# Patient Record
Sex: MALE | Race: BLACK OR AFRICAN AMERICAN | NOT HISPANIC OR LATINO | Employment: FULL TIME | URBAN - METROPOLITAN AREA
[De-identification: names, ages, dates, MRNs, and addresses within clinical notes are randomized per-mention and may not be internally consistent; named-entity substitution may affect disease eponyms.]

---

## 2018-12-14 ENCOUNTER — TELEPHONE (OUTPATIENT)
Dept: FAMILY MEDICINE | Facility: CLINIC | Age: 33
End: 2018-12-14

## 2018-12-14 NOTE — TELEPHONE ENCOUNTER
----- Message from Sondra Tafoya sent at 12/14/2018 12:24 PM CST -----  Contact: Patient  Type: Needs Medical Advice    Who Called:  Patient  Symptoms (please be specific):  na  How long has patient had these symptoms:  braeden  Pharmacy name and phone #:  braeden  Best Call Back Number: [phone] (home)    Additional Information: Patient requesting a call to update if his paperwork was received by the office, please call to advise. Thank you!

## 2018-12-14 NOTE — TELEPHONE ENCOUNTER
Left message, that we did not get any paperwork from the [facility]. Will need to reschedule appointment.

## 2018-12-17 ENCOUNTER — TELEPHONE (OUTPATIENT)
Dept: ENDOCRINOLOGY | Facility: CLINIC | Age: 33
End: 2018-12-17

## 2018-12-17 NOTE — TELEPHONE ENCOUNTER
----- Message from aLmont Schultz sent at 12/17/2018  3:51 PM CST -----  Contact: Patient  Type: Needs Medical Advice    Who Called: Patient  Best Call Back Number: [phone]  Additional Information: Patient would like the office to know that he will return with a referral. Thanks!

## 2018-12-17 NOTE — TELEPHONE ENCOUNTER
----- Message from Hallie Campbell sent at 12/17/2018  4:44 PM CST -----  Type:  Patient Returning Call    Who Called:  Patient  Who Left Message for Patient:  SAMM FARNSWORTH  Does the patient know what this is regarding?:  no  Best Call Back Number:  [phone]

## 2023-07-26 ENCOUNTER — OCCUPATIONAL HEALTH (OUTPATIENT)
Dept: URGENT CARE | Facility: CLINIC | Age: 38
End: 2023-07-26

## 2023-07-26 DIAGNOSIS — Z13.9 ENCOUNTER FOR SCREENING: Primary | ICD-10-CM

## 2023-07-26 PROCEDURE — 80305 DRUG TEST PRSMV DIR OPT OBS: CPT | Mod: S$GLB,,, | Performed by: EMERGENCY MEDICINE

## 2023-07-26 PROCEDURE — 80305 PR COLLECTION ONLY DRUG SCREEN: ICD-10-PCS | Mod: S$GLB,,, | Performed by: EMERGENCY MEDICINE

## 2023-10-16 ENCOUNTER — OCCUPATIONAL HEALTH (OUTPATIENT)
Dept: URGENT CARE | Facility: CLINIC | Age: 38
End: 2023-10-16

## 2023-10-16 DIAGNOSIS — Z13.9 ENCOUNTER FOR SCREENING: Primary | ICD-10-CM

## 2023-10-16 LAB — COLLECTION ONLY: NORMAL

## 2023-10-16 PROCEDURE — 80305 DRUG TEST PRSMV DIR OPT OBS: CPT | Mod: S$GLB,,,

## 2023-10-16 PROCEDURE — 80305 OOH COLLECTION ONLY DRUG SCREEN: ICD-10-PCS | Mod: S$GLB,,,

## 2024-03-12 ENCOUNTER — OFFICE VISIT (OUTPATIENT)
Dept: URGENT CARE | Facility: CLINIC | Age: 39
End: 2024-03-12
Payer: COMMERCIAL

## 2024-03-12 VITALS
HEIGHT: 70 IN | RESPIRATION RATE: 16 BRPM | WEIGHT: 201.19 LBS | OXYGEN SATURATION: 97 % | BODY MASS INDEX: 28.8 KG/M2 | HEART RATE: 90 BPM | DIASTOLIC BLOOD PRESSURE: 72 MMHG | SYSTOLIC BLOOD PRESSURE: 119 MMHG

## 2024-03-12 DIAGNOSIS — M79.661 PAIN IN RIGHT LOWER LEG: ICD-10-CM

## 2024-03-12 DIAGNOSIS — Z02.6 ENCOUNTER RELATED TO WORKER'S COMPENSATION CLAIM: ICD-10-CM

## 2024-03-12 DIAGNOSIS — S70.11XA CONTUSION OF RIGHT THIGH, INITIAL ENCOUNTER: ICD-10-CM

## 2024-03-12 DIAGNOSIS — M79.651 RIGHT THIGH PAIN: ICD-10-CM

## 2024-03-12 DIAGNOSIS — S39.012A LUMBOSACRAL STRAIN, INITIAL ENCOUNTER: Primary | ICD-10-CM

## 2024-03-12 PROCEDURE — 73502 X-RAY EXAM HIP UNI 2-3 VIEWS: CPT | Mod: RT,S$GLB,, | Performed by: RADIOLOGY

## 2024-03-12 PROCEDURE — 99203 OFFICE O/P NEW LOW 30 MIN: CPT | Mod: S$GLB,,, | Performed by: FAMILY MEDICINE

## 2024-03-12 PROCEDURE — 72100 X-RAY EXAM L-S SPINE 2/3 VWS: CPT | Mod: S$GLB,,, | Performed by: RADIOLOGY

## 2024-03-12 RX ORDER — NAPROXEN 500 MG/1
500 TABLET ORAL 2 TIMES DAILY
Qty: 30 TABLET | Refills: 1 | Status: SHIPPED | OUTPATIENT
Start: 2024-03-12 | End: 2024-05-15

## 2024-03-12 RX ORDER — LIDOCAINE 50 MG/G
1 PATCH TOPICAL DAILY
Qty: 15 PATCH | Refills: 1 | Status: SHIPPED | OUTPATIENT
Start: 2024-03-12 | End: 2024-05-15 | Stop reason: SDUPTHER

## 2024-03-12 RX ORDER — METHOCARBAMOL 500 MG/1
500 TABLET, FILM COATED ORAL 2 TIMES DAILY PRN
Qty: 30 TABLET | Refills: 1 | Status: SHIPPED | OUTPATIENT
Start: 2024-03-12 | End: 2024-05-15

## 2024-03-12 NOTE — LETTER
[facility] [place] [address], [address]  [facility] [address]  Phone: [phone]  Fax: [phone]  [facility]: [id number]    Pt Name: Aashish James  Injury Date: 01/10/2024   Employee ID: [id number] Date of First Treatment: 03/12/2024   Company: Networked reference to record EEP 1000[employer]      Appointment Time: 10:15 AM Arrived: 1030   Provider: Gerhard Moy MD Time Out:1230     Office Treatment:   1. Lumbosacral strain, initial encounter    2. Encounter related to worker's compensation claim    3. Right thigh pain    4. Pain in right lower leg    5. Contusion of right thigh, initial encounter      Medications Ordered This Encounter   Medications    LIDOcaine (LIDODERM) 5 %    methocarbamoL (ROBAXIN) 500 MG Tab    naproxen (NAPROSYN) 500 MG tablet      Patient Instructions: Daily home exercises/warm soaks, Attention not to aggravate affected area, Begin Physical Therapy, PT to be scheduled once authorized    Restrictions: No driving company vehicles     Return Appointment: 3/28 or sooner if needed   If Rx a medication that can be sedating, DO NOT TAKE DURING YOUR WORK DAY.    Some OTC measures to help in recovery(if no allergies to, renal issues or pregnant):  Tylenol 325mg 3x per day  Ibuprofen 400mg 3x per day OR Aleve regular strength one tablet 2x per day  Take Pepcid 20mg BID  If applicable or discussed: Magnesium OTC daily; Topical Voltaren Gel; Lidocaine patches, neoprene OTC compression sleeve  Massage area if possible  Resting of the injured area  Ice for ankle, wrist or elbow injury  Elevation of the injured area if applicable  Heating pad for muscle injury  Stretching/ROM exercises as described in clinic.   ** BE ADVISED: You should be in regular contact with your W/C  to know the status of your claim and /or (if any)pending referrals**

## 2024-03-12 NOTE — PROGRESS NOTES
Subjective:      Patient ID: Aashish James is a 38 y.o. male.    Chief Complaint: Fall    WC Initial Visit.  Pt works for [employer].  Pt states that during rainstorm he slipped and fell out of his truck on 1/10/2024.  He hit floor box of cab of truck, step and hit ground. On 1/11 saw homeopathic dr since injury occurred--meds arnica and other naturel herbs. Pt was seen at Urgent Care in [place] 3/7/2024.  Pt had leg and low back xrays--told to use OTC meds (ibuprofen/Tylenol)  .    Pt seen at [facility] in [place]. Performed xrays. Was told to f/u with ortho. .   Pain 8/10 today, pain changes with weather and daily activities  .      Fall  The accident occurred More than 1 week ago. Fall occurred: from truck. He landed on [place]. There was no blood loss. Point of impact: back. The pain is present in the right upper leg and back. The pain is at a severity of 8/10. The pain is severe. The symptoms are aggravated by cold and rotation. He has tried acetaminophen (ibuprofen) for the symptoms. The treatment provided moderate relief.     ROS  Objective:     Physical Exam  Musculoskeletal:      Lumbar back: Tenderness present. Negative right straight leg raise test and negative left straight leg raise test.        Back:         Legs:       Comments: FF to fingers 2 inches from ground.   Neg SLR  Neg DREW  Neg slump.     Inc pain in thigh and lower leg with provocative maneuvers.       Subjective c/o pain out of proportion to PE findings.   Assessment:      1. Lumbosacral strain, initial encounter    2. Encounter related to worker's compensation claim    3. Right thigh pain    4. Pain in right lower leg    5. Contusion of right thigh, initial encounter      Plan:   Pt declined toradol injection.     Medications Ordered This Encounter   Medications    LIDOcaine (LIDODERM) 5 %     Sig: Place 1 patch onto the skin once daily. Remove & Discard patch within 12 hours or as directed by MD     Dispense:  15 patch     Refill:   1    methocarbamoL (ROBAXIN) 500 MG Tab     Sig: Take 1 tablet (500 mg total) by mouth 2 (two) times daily as needed.     Dispense:  30 tablet     Refill:  1    naproxen (NAPROSYN) 500 MG tablet     Sig: Take 1 tablet (500 mg total) by mouth 2 (two) times daily.     Dispense:  30 tablet     Refill:  1     Patient Instructions: Daily home exercises/warm soaks, Attention not to aggravate affected area, Begin Physical Therapy, PT to be scheduled once authorized      No follow-ups on file.      X-Ray Hip 2 or 3 views Right (with Pelvis when performed)    Result Date: 3/12/2024  EXAMINATION: XR HIP WITH PELVIS WHEN PERFORMED, 2 OR 3  VIEWS RIGHT CLINICAL HISTORY: Pain in right thigh TECHNIQUE: AP view of the pelvis and frog leg lateral view of the right hip were performed. COMPARISON: None FINDINGS: A fracture of the right hip is not seen.  The femur and acetabulum are intact.  The bones of the pelvis and the left hip are intact.  The sacroiliac joints are sharp and symmetric.     Negative radiographs of the right hip and pelvis. Electronically signed by: Tyler Singh MD Date:    03/12/2024 Time:    11:55    XR LUMBAR SPINE 2 OR 3 VIEWS    Result Date: 3/12/2024  EXAMINATION: XR LUMBAR SPINE 2 OR 3 VIEWS CLINICAL HISTORY: Strain of muscle, fascia and tendon of lower back, initial encounter COMPARISON: 05/02/2005 FINDINGS: Four views lumbar spine. Lateral imaging demonstrates adequate alignment of the lumbar spine without significant vertebral body height loss or disc space height loss.  There is superior endplate height loss involving T11, may be on the basis of Schmorl's node however correlation with any focal tenderness recommended.  The facet joints are aligned.  The sacral coccygeal segments are aligned.  AP spinal alignment is remarkable for dextroscoliotic curvature.  The sacroiliac joints are intact.     1. No acute displaced fracture or dislocation of the lumbar spine. 2. Height loss involving the superior  endplate of T11, may be on the basis of Schmorl's node however no prior exams are available for comparison.  Correlation with any focal tenderness as warranted. Electronically signed by: Dixon Lowe MD Date:    03/12/2024 Time:    11:51

## 2024-03-20 ENCOUNTER — CLINICAL SUPPORT (OUTPATIENT)
Dept: REHABILITATION | Facility: HOSPITAL | Age: 39
End: 2024-03-20
Payer: COMMERCIAL

## 2024-03-20 DIAGNOSIS — M54.50 ACUTE BILATERAL LOW BACK PAIN WITHOUT SCIATICA: ICD-10-CM

## 2024-03-20 DIAGNOSIS — S39.012S STRAIN OF LUMBAR REGION, SEQUELA: Primary | ICD-10-CM

## 2024-03-20 DIAGNOSIS — Z74.09 IMPAIRED FUNCTIONAL MOBILITY AND ACTIVITY TOLERANCE: ICD-10-CM

## 2024-03-20 DIAGNOSIS — M79.604 RIGHT LEG PAIN: ICD-10-CM

## 2024-03-20 DIAGNOSIS — M79.661 PAIN OF RIGHT LOWER LEG: ICD-10-CM

## 2024-03-20 PROCEDURE — 97530 THERAPEUTIC ACTIVITIES: CPT | Mod: PN

## 2024-03-20 PROCEDURE — 97161 PT EVAL LOW COMPLEX 20 MIN: CPT | Mod: PN

## 2024-03-20 NOTE — PLAN OF CARE
[facility]  Physical Therapy Initial Evaluation    Name: Aashish James  Clinic Number: [id number]    Therapy Diagnosis:   Encounter Diagnoses   Name Primary?    Strain of lumbar region, sequela Yes    Pain of right lower leg     Acute bilateral low back pain without sciatica     Right leg pain     Impaired functional mobility and activity tolerance       Physician: Gerhard Moy, *    Physician Orders: PT Eval and Treat  Medical Diagnosis from Referral:   S39.012A (ICD-10-CM) - Lumbosacral strain, initial encounter   M79.651 (ICD-10-CM) - Right thigh pain   M79.661 (ICD-10-CM) - Pain in right lower leg   S70.11XA (ICD-10-CM) - Contusion of right thigh, initial encounter     Evaluation Date: 3/20/2024  Authorization Period Expiration: 12/31/24  Plan of Care Expiration: 6/1/24    Progress Update: 4/22/24    Visit # / Visits authorized: 1 / 12    FOTO: Visit #1 - 1/3     PRECAUTIONS: Standard Precautions     MD Follow-up: /2023    Time In: 200  Time Out: 300  Total Appointment Time (timed & untimed codes): 60 minutes    SUBJECTIVE     Date of onset: January 10th     History of current condition - Aashish is a 38 y.o. male whom reports slipped out of truck landed on right hip, causing pain in his low back and right leg to his ankle.  Also reporting his left neck and shoulder feel tight. . Feels like he is getting worse, more stiff  since injury.  No treatment so far. Pain in his back down to his right ankle. Bending forward, sleeping on left , squatting ,walking all hurt..  Aashish's current exercise regiment includes: none .  Seeking Physical Therapy for get out of pain and back to work .    RAMY: Slip fall   Falls: 1   Physician Instructions (per patient): none   Other concerns: none     Imaging: X-RAY: Impression:     1. No acute displaced fracture or dislocation of the lumbar spine.  2. Height loss involving the superior endplate of T11, may be on the basis of Schmorl's node  however no prior exams are available for comparison.  Correlation with any focal tenderness as warranted.       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    Surgical History:   Aashish James  has no past surgical history on file.    Medications:   Aashish has a current medication list which includes the following prescription(s): lidocaine, methocarbamol, and naproxen.    Allergies:   Review of patient's allergies indicates:  No Known Allergies     OBJECTIVE     RANGE OF MOTION:      Lumbar AROM/PROM Right  (spine) Left   Pain/Dysfunction with Movement Goal   Lumbar Flexion (60) 30  Pain Reported 60  Initial:    Lumbar Extension (30) 10  Pain Reported  30  Initial:    Lumbar Side Bending (25) 10 10 Pain to the right  25  Initial:    Lumbar Rotation 5 5  Pain Free  Initial:      Hip AROM/PROM Right   Left   Pain/Dysfunction with Movement Goal   Hip IR (45) 25 30  40  Initial:    Hip ER (45) 30 30  40  Initial:      NEURO SCREEN:    Neuro Testing Right   Left   Details   Reflexes  Not tested  Not tested     Dermatomes normal normal    Myotomes normal normal    Tone normal normal    Spasticity Not present Not present        FUNCTIONAL SCREEN:    Lower Extremity STRENGTH Details   Hip Grossly 4-/5 Right with pain during testing   Knee Grossly 4-/5 Pain with  testing    Foot/Ankle Grossly 5/5 No pain or discomfort     Abdominal Strength STRENGTH Details   Pelvic Tilt Poor     Double Leg Drop Unable     Plank Not tested         JOINT MOBILITY:     Joint Motion Tested Right  (spine)   Left    Goal               Thoracic Mobility: T1-12 Hypomobile Hypomobile Normal B   Lumbar Mobility: L1-5 Hypomobile Hypomobile Normal B   Sacral Mobility Hypomobile Hypomobile Normal B     SPECIAL TESTS:     Right  (spine)   Left    Goal   Slump Negative Negative Negative B    Straight leg raise  Negative Negative Negative B    Sacral Provocation Negative Negative Negative B      Sensation:  Sensation is intact to light touch    Palpation: Increased tone and tenderness noted with palpation to right lumbar paraspinals and right QL     Posture:  Pt presents with postural abnormalities which include: Lordosis, sits with lean to left to avoid pressure on the right     Gait Analysis: The patient ambulated with the following assistive device: none; the pt presents with the following gait abnormalities: decreased step length, kathy, and arm swing; increased forward flexed posture, trunk sway -     Movement Analysis:   Functional Test  Outcome   Double Leg Squat Lack of hip motion, lack of depth    Single Leg Step Down Unable        Balance: Balance:    RIGHT   LEFT   Goal   Closed   60 seconds     Tandem   20 seconds     Single Leg 2 8 20 seconds         FUNCTION:     CMS Impairment/Limitation/Restriction for FOTO oswestry  Survey    Therapist reviewed FOTO scores for Aashish James on 3/20/2024.   FOTO documents entered into [email or web address] - see Media section.    Limitation Score: 55%         TREATMENT     Total Treatment time separate from Evaluation: (15) minutes    Aashish received the treatments listed below:      THERAPEUTIC EXERCISES: to develop strength, endurance, ROM, flexibility, posture and core stabilization for (15)  minutes including: x = performed today    TherEx 3/20/2024    Lower trunk  rotation      Supine Hip Abduction Green TheraBand      Prone on elbows  Right Sciatic glides      Stand Hip Extension leaning on mat      BOLD = new this visit  Plan for Next Visit:     Bike or Nu Step     Lower trunk rotation  Posterior pelvic tilt   Hip ADD Ball   Bridges   Sidelying clams     Hamstring stretch  Gastroc stretch   PhysioBall rollout    Shuttle Bilateral   Shuttle single leg  Palloff press  Precor lumbar extension         PATIENT EDUCATION AND HOME EXERCISES     Education/Self-Care provided:  (included in treatment) minutes   Patient educated on the impairments noted above and the effects of physical therapy intervention to improve overall condition and QOL.   Patient was educated on all the above exercise prior/during/after for proper posture, positioning, and execution for safe performance with home exercise program.   Exercise/Activity modifications:   3/20/2024: EVAL:     Written Home Exercises Provided: yes. Prefers: Electronic Copies  Exercises were reviewed and Aashish was able to demonstrate them prior to the end of the session.  Aashish demonstrated good understanding of the education provided. See EMR under Patient Instructions for exercises provided during therapy sessions.    ASSESSMENT     Aashish is a 38 y.o. male referred to outpatient Physical Therapy with a medical diagnosis of Low back and right leg pain after slip and fall from truck .  Aashish presents with clinical signs and symptoms that support this diagnosis with . Decreased lumbar ROM, decreased lower extremity strength (3-/5 Right due to pain),  lower extremity neural tension, and impaired functional mobility. Radicular symptoms are present from the lumbar spine down into the forefoot of his left lower extremity.    The above impairments will be addressed through manual therapy techniques, therapeutic exercises, functional training, and modalities as necessary. Patient was treated and educated on exercises for home  program, progression of therapy, and benefits of therapy to achieve full functional mobility.     Pt prognosis is Fair.   Pt will benefit from skilled outpatient Physical Therapy to address the deficits stated above and in the chart below, provide pt/family education, and to maximize pt's level of independence.     Plan of care discussed with patient: Yes  Pt's spiritual, cultural and educational needs considered and patient is agreeable to the plan of care and goals as stated below:     Anticipated Barriers for therapy: none  Medical Necessity is demonstrated by the following  History  Co-morbidities and personal factors that may impact the plan of care [x] LOW: no personal factors / co-morbidities  [] MODERATE: 1-2 personal factors / co-morbidities  [] HIGH: 3+ personal factors / co-morbidities    Moderate / High Support Documentation:      Examination  Body Structures and Functions, activity limitations and participation restrictions that may impact the plan of care [x] LOW: addressing 1-2 elements  [] MODERATE: 3+ elements  [] HIGH: 4+ elements (please support below)    Moderate / High Support Documentation:      Clinical Presentation [x] LOW: stable  [] MODERATE: Evolving  [] HIGH: Unstable     Decision Making/ Complexity Score: low         GOALS:  SHORT TERM GOALS:  3 weeks  Progress Date met   Recent signs and systems trend is improving in order to progress towards LTG's. [] Met  [] Not Met  [x] Progressing     Patient will be independent with HEP in order to further progress and return to maximal function. [] Met  [] Not Met  [x] Progressing     Pain rating at Worst: 5/10 in order to progress towards increased independence with activity. [] Met  [] Not Met  [x] Progressing     Patient will be able to correct postural deviations in sitting and standing, to decrease pain and promote postural awareness for injury prevention.  [] Met  [] Not Met  [x] Progressing      [] Met  [] Not Met  [] Progressing         LONG TERM GOALS: 6 weeks  Progress Date met   Patient will return to normal ADL, recreational, and work related activities with less pain and limitation.  [] Met  [] Not Met  [x] Progressing     Patient will improve AROM to stated goals in order to return to maximal functional potential.  [] Met  [] Not Met  [x] Progressing     Patient will improve Strength to stated goals of appropriate musculature in order to improve functional independence.  [] Met  [] Not Met  [x] Progressing     Pain Rating at Best: 1/10 to improve Quality of Life.  [] Met  [] Not Met  [x] Progressing     Patient will meet predicted functional outcome (FOTO) score: 39% to increase self-worth & perceived functional ability. [] Met  [] Not Met  [x] Progressing     Patient will have met/partially met personal goal of: Lower back and leg pain and able to return to work  [] Met  [] Not Met  [x] Progressing      [] Met  [] Not Met  [] Progressing        PLAN   Plan of care Certification: 3/20/2024 to 6/1/24    Outpatient Physical Therapy 2 times weekly for 6 weeks to include any combination of the following interventions: virtual visits, dry needling, modalities, electrical stimulation (IFC, Pre-Mod, Attended with Functional Dry Needling), Manual Therapy, Moist Heat/ Ice, Neuromuscular Re-ed, Patient Education, Self Care, Therapeutic Activities, Therapeutic Exercise, Functional Training, and Therapeutic Activites     Thank you for this referral.    Tyler Painting, PT      I CERTIFY THE NEED FOR THESE SERVICES FURNISHED UNDER THIS PLAN OF TREATMENT AND WHILE UNDER MY CARE   Physician's comments:     Physician's Signature: ___________________________________________________

## 2024-03-21 PROBLEM — M79.604 RIGHT LEG PAIN: Status: ACTIVE | Noted: 2024-03-21

## 2024-03-21 PROBLEM — Z74.09 IMPAIRED FUNCTIONAL MOBILITY AND ACTIVITY TOLERANCE: Status: ACTIVE | Noted: 2024-03-21

## 2024-03-21 PROBLEM — M54.50 ACUTE BILATERAL LOW BACK PAIN WITHOUT SCIATICA: Status: ACTIVE | Noted: 2024-03-21

## 2024-03-28 ENCOUNTER — OFFICE VISIT (OUTPATIENT)
Dept: URGENT CARE | Facility: CLINIC | Age: 39
End: 2024-03-28
Payer: OTHER MISCELLANEOUS

## 2024-03-28 VITALS
TEMPERATURE: 98 F | RESPIRATION RATE: 18 BRPM | DIASTOLIC BLOOD PRESSURE: 74 MMHG | BODY MASS INDEX: 28.77 KG/M2 | OXYGEN SATURATION: 97 % | HEART RATE: 70 BPM | HEIGHT: 70 IN | SYSTOLIC BLOOD PRESSURE: 124 MMHG | WEIGHT: 201 LBS

## 2024-03-28 DIAGNOSIS — M25.512 ACUTE PAIN OF BOTH SHOULDERS: ICD-10-CM

## 2024-03-28 DIAGNOSIS — M25.511 ACUTE PAIN OF BOTH SHOULDERS: ICD-10-CM

## 2024-03-28 DIAGNOSIS — Z02.6 ENCOUNTER RELATED TO WORKER'S COMPENSATION CLAIM: Primary | ICD-10-CM

## 2024-03-28 DIAGNOSIS — M79.651 RIGHT THIGH PAIN: ICD-10-CM

## 2024-03-28 DIAGNOSIS — S39.012D LUMBOSACRAL STRAIN, SUBSEQUENT ENCOUNTER: ICD-10-CM

## 2024-03-28 DIAGNOSIS — S70.11XD CONTUSION OF RIGHT THIGH, SUBSEQUENT ENCOUNTER: ICD-10-CM

## 2024-03-28 DIAGNOSIS — M79.661 PAIN IN RIGHT LOWER LEG: ICD-10-CM

## 2024-03-28 PROCEDURE — 99213 OFFICE O/P EST LOW 20 MIN: CPT | Mod: S$GLB,,, | Performed by: FAMILY MEDICINE

## 2024-03-28 NOTE — PROGRESS NOTES
Subjective:      Patient ID: Aashish James is a 38 y.o. male.    Chief Complaint:   Patient's place of employment - [employer]  Patient's job title - /owner.   Date of Injury - 1/10/2024  Body part injured - leg and back  Current work status per last visit -No driving company vehicles   Improved, same, or worse - worse  Pain Scale right now (1-10) - 6  Pt states that he's having more pain in the left shoulder area, he's also taking Naproxen            3/12/2024- Initial Visit.  Pt works for [email or web address].  Pt states that during rainstorm he slipped and fell out of his truck on 1/10/2024.  He hit floor box of cab of truck, step and hit ground. On 1/11 saw homeopathic dr since injury occurred--meds arnica and other naturel herbs. Pt was seen at Urgent Care in [place] 3/7/2024.  Pt had leg and low back xrays--told to use OTC meds (ibuprofen/Tylenol)  .    Pt seen at [facility] in [place]. Performed xrays. Was told to f/u with ortho. .   Pain 8/10 today, pain changes with weather and daily activities  .      Back Pain  This is a new problem. The current episode started in the past 7 days. The problem occurs intermittently. The problem has been gradually worsening since onset. Pain location: cervical region. The quality of the pain is described as aching (pinched nerve/ stinging). The pain radiates to the right thigh (left shoulder). The pain is at a severity of 6/10. The pain is moderate. The pain is Worse during the day. The symptoms are aggravated by bending and position. Stiffness is present All day. Associated symptoms include weakness. Treatments tried: robaxin and naproxen. The treatment provided mild relief.       Musculoskeletal:  Positive for back pain.     Objective:     Physical Exam  Musculoskeletal:        Arms:       Normal ROM of shoulders.       Musculoskeletal:      Lumbar back: Tenderness present. Negative right straight leg raise test and negative left straight leg raise test.         Back:         Legs:       Comments: FF to fingers 2 inches from ground.   Neg SLR  Neg DREW  Neg slump.     Inc pain in thigh and lower leg with provocative maneuvers.           Subjective c/o pain out of proportion to PE findings.     Assessment:      1. Encounter related to worker's compensation claim    2. Lumbosacral strain, subsequent encounter    3. Right thigh pain    4. Pain in right lower leg    5. Contusion of right thigh, subsequent encounter    6. Acute pain of both shoulders      Plan:     Pt reports pain of upper back/shoulders today- did not mention at initial visit. Pt states that it may be because of the way he is sleeping to help accommodate for the significant pain that he is having on his right thigh. Of not pt ambulating without difficulty.   1 PT session- reports that he missed his first appt bc he could not get out of bed. Advised that it would be prudent to keep all appts scheduled.      Patient Instructions: Continue Physical Therapy, Attention not to aggravate affected area, Daily home exercises/warm soaks   Restrictions: No driving company vehicles  No follow-ups on file.

## 2024-03-28 NOTE — LETTER
[facility] [place] [address], [address]  [facility] [address]  Phone: [phone]  Fax: [phone]  [facility]: [email or web address]    Pt Name: Aashish James  Injury Date: 01/10/2024   Employee ID: [id number] Date of Treatment: 03/28/2024   Company: Networked reference to record EEP 1000[employer]      Appointment Time: 01:15 PM Arrived: 311   Provider: Gerhard Moy MD Time Out:350     Office Treatment:   1. Encounter related to worker's compensation claim    2. Lumbosacral strain, subsequent encounter    3. Right thigh pain    4. Pain in right lower leg    5. Contusion of right thigh, subsequent encounter    6. Acute pain of both shoulders          Patient Instructions: Continue Physical Therapy, Attention not to aggravate affected area, Daily home exercises/warm soaks    Restrictions: No driving company vehicles     Return Appointment: 5/15 or sooner if needed     If Rx a medication that can be sedating, DO NOT TAKE DURING YOUR WORK DAY.    Some OTC measures to help in recovery(if no allergies to, renal issues or pregnant):  Tylenol 325mg 3x per day  Ibuprofen 400mg 3x per day OR Aleve regular strength one tablet 2x per day  Take Pepcid 20mg BID  If applicable or discussed: Magnesium OTC daily; Topical Voltaren Gel; Lidocaine patches, neoprene OTC compression sleeve  Massage area if possible  Resting of the injured area  Ice for ankle, wrist or elbow injury  Elevation of the injured area if applicable  Heating pad for muscle injury  Stretching/ROM exercises as described in clinic.   ** BE ADVISED: You should be in regular contact with your W/C  to know the status of your claim and /or (if any)pending referrals**

## 2024-04-02 ENCOUNTER — CLINICAL SUPPORT (OUTPATIENT)
Dept: REHABILITATION | Facility: HOSPITAL | Age: 39
End: 2024-04-02
Payer: COMMERCIAL

## 2024-04-02 DIAGNOSIS — M79.604 RIGHT LEG PAIN: ICD-10-CM

## 2024-04-02 DIAGNOSIS — Z74.09 IMPAIRED FUNCTIONAL MOBILITY AND ACTIVITY TOLERANCE: ICD-10-CM

## 2024-04-02 DIAGNOSIS — M54.50 ACUTE BILATERAL LOW BACK PAIN WITHOUT SCIATICA: Primary | ICD-10-CM

## 2024-04-02 PROCEDURE — 97112 NEUROMUSCULAR REEDUCATION: CPT | Mod: PN

## 2024-04-02 NOTE — PROGRESS NOTES
[facility]  Outpatient Physical Therapy Daily Treatment Note      Name: Aashish James  Clinic Number: [id number]  Visit Date: 4/2/2024    Therapy Diagnosis:   Encounter Diagnoses   Name Primary?    Acute bilateral low back pain without sciatica Yes    Right leg pain     Impaired functional mobility and activity tolerance        Physician: Gerhard Moy, *  Physician Orders: PT Eval and Treat  Medical Diagnosis from Referral:   S39.012A (ICD-10-CM) - Lumbosacral strain, initial encounter   M79.651 (ICD-10-CM) - Right thigh pain   M79.661 (ICD-10-CM) - Pain in right lower leg   S70.11XA (ICD-10-CM) - Contusion of right thigh, initial encounter      Evaluation Date: 3/20/2024  Authorization Period Expiration: 12/31/24  Plan of Care Expiration: 6/1/24                      Progress Update: 4/22/24                   Visit # / Visits authorized: 1 / 12                    FOTO: Visit #1 - 1/3     PTA Visit #: 0/5     FOTO Due Visit 5 -  Follow up  FOTO Due Visit 10 - Follow up    Time In: 200  Time Out: 240  Total Billable Timed: 40 minutes  Total Billable Un-timed: 0 minutes    Precautions: Standard    Subjective     The patient presents to therapy Continued pain, has been doing Home Exercise Program     Response to previous treatment: Eval   Functional change: none     Pain: 6/10  Location: right back  and Lower Extremity       Objective       Aashish received therapeutic exercises to develop strength, endurance, ROM, flexibility, posture, and core stabilization for  minutes including:  +Bike or Nu Step       Aashish participated in neuromuscular re-education activities to improve: Balance, Coordination, Kinesthetic, Sense, Proprioception, and Posture for 40 minutes. The following activities were included:    Lower trunk rotation x 20  Posterior pelvic tilt x 20   Hip ADD Ball x 20   Bridges x 20   Sidelying clams x 20      Hamstring stretch 3 x 30 sec   Gastroc stretch 3 x 30 sec    PhysioBall rollout x 20   Right Sciatic Glides with foot propped x 20      Shuttle Bilateral 37 x 20   Shuttle single leg 12# x 20   Palloff press 7# x 20   +Precor lumbar extension   Stand hip extension with Wedge x 20 bilateral     Patient Education and HEP     He was compliant with home exercise program.    Education provided:   - Continue with Home Exercise Program, stay as active as possible     Written Home Exercises Provided: Patient instructed to cont prior HEP.  Exercises were reviewed and Aashish was able to demonstrate them prior to the end of the session.  Aashish demonstrated fair  understanding of the education provided.     See EMR under Patient Instructions for exercises provided prior visit.    Assessment     The patient has back and right leg pain from accident at work.  Motion is guarded.  Pain with back extension.  Nerve glides to help with Right Lower Extremity pain    Pt will continue to benefit from skilled outpatient physical therapy to address the deficits listed in the problem list box on initial evaluation, provide pt/family education and to maximize pt's level of independence in the home and community environment.     Aashish Is progressing well towards his goals.   Pt prognosis is Good.     Pt's spiritual, cultural and educational needs considered and pt agreeable to plan of care and goals.    Anticipated barriers to physical therapy: none    Goals:   SHORT TERM GOALS:  3 weeks  Progress Date met   Recent signs and systems trend is improving in order to progress towards LTG's. [] Met  [] Not Met  [x] Progressing     Patient will be independent with HEP in order to further progress and return to maximal function. [] Met  [] Not Met  [x] Progressing     Pain rating at Worst: 5/10 in order to progress towards increased independence with activity. [] Met  [] Not Met  [x] Progressing     Patient will be able to correct postural deviations in sitting and standing, to decrease pain and  promote postural awareness for injury prevention.  [] Met  [] Not Met  [x] Progressing       [] Met  [] Not Met  [] Progressing        LONG TERM GOALS: 6 weeks  Progress Date met   Patient will return to normal ADL, recreational, and work related activities with less pain and limitation.  [] Met  [] Not Met  [x] Progressing     Patient will improve AROM to stated goals in order to return to maximal functional potential.  [] Met  [] Not Met  [x] Progressing     Patient will improve Strength to stated goals of appropriate musculature in order to improve functional independence.  [] Met  [] Not Met  [x] Progressing     Pain Rating at Best: 1/10 to improve Quality of Life.  [] Met  [] Not Met  [x] Progressing     Patient will meet predicted functional outcome (FOTO) score: 39% to increase self-worth & perceived functional ability. [] Met  [] Not Met  [x] Progressing     Patient will have met/partially met personal goal of: Lower back and leg pain and able to return to work  [] Met  [] Not Met  [x] Progressing        Plan     Continue with the plan of care established per initial evaluation    Tyler Painting, PT

## 2024-04-04 ENCOUNTER — DOCUMENTATION ONLY (OUTPATIENT)
Dept: REHABILITATION | Facility: HOSPITAL | Age: 39
End: 2024-04-04

## 2024-04-04 ENCOUNTER — CLINICAL SUPPORT (OUTPATIENT)
Dept: REHABILITATION | Facility: HOSPITAL | Age: 39
End: 2024-04-04
Payer: COMMERCIAL

## 2024-04-04 DIAGNOSIS — Z74.09 IMPAIRED FUNCTIONAL MOBILITY AND ACTIVITY TOLERANCE: ICD-10-CM

## 2024-04-04 DIAGNOSIS — M54.50 ACUTE BILATERAL LOW BACK PAIN WITHOUT SCIATICA: Primary | ICD-10-CM

## 2024-04-04 DIAGNOSIS — M79.604 RIGHT LEG PAIN: ICD-10-CM

## 2024-04-04 PROCEDURE — 97112 NEUROMUSCULAR REEDUCATION: CPT | Mod: PN,CQ

## 2024-04-04 PROCEDURE — 97110 THERAPEUTIC EXERCISES: CPT | Mod: PN,CQ

## 2024-04-04 NOTE — PROGRESS NOTES
[facility]  Outpatient Physical Therapy Daily Treatment Note      Name: Aashish James  Clinic Number: [id number]  Visit Date: 4/4/2024    Therapy Diagnosis:   Encounter Diagnoses   Name Primary?    Acute bilateral low back pain without sciatica Yes    Right leg pain     Impaired functional mobility and activity tolerance        Physician: Gerhard Moy, *  Physician Orders: PT Eval and Treat  Medical Diagnosis from Referral:   S39.012A (ICD-10-CM) - Lumbosacral strain, initial encounter   M79.651 (ICD-10-CM) - Right thigh pain   M79.661 (ICD-10-CM) - Pain in right lower leg   S70.11XA (ICD-10-CM) - Contusion of right thigh, initial encounter      Evaluation Date: 3/20/2024  Authorization Period Expiration: 12/31/24  Plan of Care Expiration: 6/1/24                      Progress Update: 4/22/24                   Visit # / Visits authorized: 2 / 12                    FOTO: Visit #1 - 1/3     FOTO Due Visit 5 -  Follow up  FOTO Due Visit 10 - Follow up    Time In: 400p  Time Out: 450p  Total Billable Timed: 50 minutes  Total Billable Un-timed: 0 minutes    Precautions: Standard    Subjective     Pain in Low Back remains    Response to previous treatment: no complaints   Functional change: none     Pain: 8/10  Location: right back  and Lower Extremity       Objective   Tech assisted with treatment  Aashish received therapeutic exercises to develop strength, endurance, ROM, flexibility, posture, and core stabilization for 10 minutes including:     Bike x 10 minutes, level 3    Aashish participated in neuromuscular re-education activities to improve: Balance, Coordination, Kinesthetic, Sense, Proprioception, and Posture for 40 minutes:    Lower trunk rotation x 20  Posterior pelvic tilt x 20   Hip ADD Ball x 20   Bridges x 20   Sidelying clams Green Theraband x 20     Stand hip extension with Wedge x 20 bilateral     Hamstring stretch 3 x 30 sec   Gastroc stretch 3 x 30 sec   PhysioBall  rollout x 20   Right Sciatic Glides with foot propped x 20      Shuttle Bilateral 37 x 20   Shuttle single leg 12# x 20     Palloff press 7# x 20     Precor lumbar extension 40# x 20      Patient Education and HEP     He was compliant with home exercise program.    Education provided:   - Continue with Home Exercise Program, stay as active as possible     Written Home Exercises Provided: Patient instructed to cont prior HEP.  Exercises were reviewed and Aashish was able to demonstrate them prior to the end of the session.  Aashish demonstrated fair  understanding of the education provided.     See EMR under Patient Instructions for exercises provided prior visit.    Assessment     Aashish tolerated all PRE's with good form.  He ambulates with a smooth kathy, even step length, and upright posture.   No pain with transfers supine-sit-stand.  He reported increased soreness in his Low Back with exercises.  Continued core and hip strengthening along with neural glides to decrease Low back and LE pain.      Pt will continue to benefit from skilled outpatient physical therapy to address the deficits listed in the problem list box on initial evaluation, provide pt/family education and to maximize pt's level of independence in the home and community environment.     Aashish Is progressing well towards his goals.   Pt prognosis is Good.     Pt's spiritual, cultural and educational needs considered and pt agreeable to plan of care and goals.    Anticipated barriers to physical therapy: none    Goals:   SHORT TERM GOALS:  3 weeks  Progress  4/4/2024 Date met   Recent signs and systems trend is improving in order to progress towards LTG's. [] Met  [] Not Met  [x] Progressing     Patient will be independent with HEP in order to further progress and return to maximal function. [] Met  [] Not Met  [x] Progressing     Pain rating at Worst: 5/10 in order to progress towards increased independence with activity. [] Met  [] Not  Met  [x] Progressing     Patient will be able to correct postural deviations in sitting and standing, to decrease pain and promote postural awareness for injury prevention.  [] Met  [] Not Met  [x] Progressing       [] Met  [] Not Met  [] Progressing        LONG TERM GOALS: 6 weeks  Progress  4/4/2024 Date met   Patient will return to normal ADL, recreational, and work related activities with less pain and limitation.  [] Met  [] Not Met  [x] Progressing     Patient will improve AROM to stated goals in order to return to maximal functional potential.  [] Met  [] Not Met  [x] Progressing     Patient will improve Strength to stated goals of appropriate musculature in order to improve functional independence.  [] Met  [] Not Met  [x] Progressing     Pain Rating at Best: 1/10 to improve Quality of Life.  [] Met  [] Not Met  [x] Progressing     Patient will meet predicted functional outcome (FOTO) score: 39% to increase self-worth & perceived functional ability. [] Met  [] Not Met  [x] Progressing     Patient will have met/partially met personal goal of: Lower back and leg pain and able to return to work  [] Met  [] Not Met  [x] Progressing        Plan     Continue with the plan of care established per initial evaluation    Betzy Griffith, PTA

## 2024-04-04 NOTE — PROGRESS NOTES
PT/PTA met face to face to discuss pt's treatment plan and progress towards established goals. Pt will be seen by a physical therapist minimally every 6th visit or every 30 days.    Betzy Griffith PTA

## 2024-04-09 ENCOUNTER — CLINICAL SUPPORT (OUTPATIENT)
Dept: REHABILITATION | Facility: HOSPITAL | Age: 39
End: 2024-04-09
Payer: COMMERCIAL

## 2024-04-09 DIAGNOSIS — M79.604 RIGHT LEG PAIN: ICD-10-CM

## 2024-04-09 DIAGNOSIS — M54.50 ACUTE BILATERAL LOW BACK PAIN WITHOUT SCIATICA: Primary | ICD-10-CM

## 2024-04-09 DIAGNOSIS — Z74.09 IMPAIRED FUNCTIONAL MOBILITY AND ACTIVITY TOLERANCE: ICD-10-CM

## 2024-04-09 PROCEDURE — 97112 NEUROMUSCULAR REEDUCATION: CPT | Mod: PN,CQ

## 2024-04-09 PROCEDURE — 97530 THERAPEUTIC ACTIVITIES: CPT | Mod: PN,CQ

## 2024-04-09 NOTE — PROGRESS NOTES
"[facility]  Outpatient Physical Therapy Daily Treatment Note      Name: Aashish James  Clinic Number: [id number]  Visit Date: 4/9/2024    Therapy Diagnosis:   Encounter Diagnoses   Name Primary?    Acute bilateral low back pain without sciatica Yes    Right leg pain     Impaired functional mobility and activity tolerance        Physician: Gerhard Moy, *  Physician Orders: PT Eval and Treat  Medical Diagnosis from Referral:   S39.012A (ICD-10-CM) - Lumbosacral strain, initial encounter   M79.651 (ICD-10-CM) - Right thigh pain   M79.661 (ICD-10-CM) - Pain in right lower leg   S70.11XA (ICD-10-CM) - Contusion of right thigh, initial encounter      Evaluation Date: 3/20/2024  Authorization Period Expiration: 12/31/24  Plan of Care Expiration: 6/1/24                      Progress Update: 4/22/24                   Visit # / Visits authorized: 4 / 12                    FOTO: Visit #1 - 1/3     FOTO Due Visit 5 -  Follow up  FOTO Due Visit 10 - Follow up    Time In: 401p  Time Out: 456p  Total Billable Timed: 55 minutes  Total Billable Un-timed: 0 minutes    Precautions: Standard    Subjective     "It ain't gonna happen overnight."    Response to previous treatment: good   Functional change: none     Pain: 7/10, 6-7 LE's  Location: right back  and Lower Extremity       Objective   Tech assisted with treatment  Aashish received therapeutic exercises to develop strength, endurance, ROM, flexibility, posture, and core stabilization for 0 minutes including:     Bike x 10 minutes, level 3  NOT PERFORMED     Aashish participated in neuromuscular re-education activities to improve: Balance, Coordination, Kinesthetic, Sense, Proprioception, and Posture for 45 minutes:    Lower trunk rotation x 20 Bilateral   Posterior pelvic tilt x 30   Hip ADD Ball x 30   Bridges Green Theraband x 30   Sidelying clams Green Theraband x 20  Bilateral     Stand hip extension with Wedge x 20 bilateral "     Hamstring stretch 3 x 30 sec   PhysioBall rollout x 20   Right Sciatic Glides with foot propped x 20      Shuttle Bilateral 37 x 20     Shuttle single leg 12# x 20     Gastroc stretch 3 x 30 sec -  slant board   Hip 3 way 2 x 10 Bilateral     Palloff press 10# x 20     THERAPEUTIC ACTIVITIES x 10 minutes to improve functional activities:    Precor lumbar extension 40# x 20  Precor hip adduction 65# x 30   Precor hip abduction 65# x 30    Patient Education and HEP     He was compliant with home exercise program.    Education provided:   - Continue with Home Exercise Program, stay as active as possible     Written Home Exercises Provided: Patient instructed to cont prior HEP.  Exercises were reviewed and Aashish was able to demonstrate them prior to the end of the session.  Aashish demonstrated fair  understanding of the education provided.     See EMR under Patient Instructions for exercises provided prior visit.    Assessment     Aashish tolerated PRE's with good form.  He was challenged appropriately and was advanced to further improve his hip and Low Back strength.        Pt will continue to benefit from skilled outpatient physical therapy to address the deficits listed in the problem list box on initial evaluation, provide pt/family education and to maximize pt's level of independence in the home and community environment.     Aashish Is progressing well towards his goals.   Pt prognosis is Good.     Pt's spiritual, cultural and educational needs considered and pt agreeable to plan of care and goals.    Anticipated barriers to physical therapy: none    Goals:   SHORT TERM GOALS:  3 weeks  Progress  4/9/2024 Date met   Recent signs and systems trend is improving in order to progress towards LTG's. [] Met  [] Not Met  [x] Progressing     Patient will be independent with HEP in order to further progress and return to maximal function. [] Met  [] Not Met  [x] Progressing     Pain rating at Worst: 5/10 in order to  progress towards increased independence with activity. [] Met  [] Not Met  [x] Progressing     Patient will be able to correct postural deviations in sitting and standing, to decrease pain and promote postural awareness for injury prevention.  [] Met  [] Not Met  [x] Progressing        LONG TERM GOALS: 6 weeks  Progress  4/9/2024 Date met   Patient will return to normal ADL, recreational, and work related activities with less pain and limitation.  [] Met  [] Not Met  [x] Progressing     Patient will improve AROM to stated goals in order to return to maximal functional potential.  [] Met  [] Not Met  [x] Progressing     Patient will improve Strength to stated goals of appropriate musculature in order to improve functional independence.  [] Met  [] Not Met  [x] Progressing     Pain Rating at Best: 1/10 to improve Quality of Life.  [] Met  [] Not Met  [x] Progressing     Patient will meet predicted functional outcome (FOTO) score: 39% to increase self-worth & perceived functional ability. [] Met  [] Not Met  [x] Progressing     Patient will have met/partially met personal goal of: Lower back and leg pain and able to return to work  [] Met  [] Not Met  [x] Progressing        Plan     Continue with the plan of care established per initial evaluation    Betzy Griffith, PTA

## 2024-04-15 ENCOUNTER — TELEPHONE (OUTPATIENT)
Dept: URGENT CARE | Facility: CLINIC | Age: 39
End: 2024-04-15

## 2024-04-15 NOTE — TELEPHONE ENCOUNTER
Patient called and states that the company informed him that the doctor did not take him out of work and that he was suppose to be back at work. I informed the patient that he would have to come see Dr. NULL for this problem if he wants a note stating that he's out of work because,  I'm not seeing It either. 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 I have rescheduled his appointment for 4/16/2024.

## 2024-04-16 ENCOUNTER — OFFICE VISIT (OUTPATIENT)
Dept: URGENT CARE | Facility: CLINIC | Age: 39
End: 2024-04-16
Payer: OTHER MISCELLANEOUS

## 2024-04-16 ENCOUNTER — CLINICAL SUPPORT (OUTPATIENT)
Dept: REHABILITATION | Facility: HOSPITAL | Age: 39
End: 2024-04-16
Payer: COMMERCIAL

## 2024-04-16 VITALS
BODY MASS INDEX: 28.77 KG/M2 | HEART RATE: 70 BPM | OXYGEN SATURATION: 96 % | TEMPERATURE: 98 F | WEIGHT: 201 LBS | DIASTOLIC BLOOD PRESSURE: 80 MMHG | HEIGHT: 70 IN | RESPIRATION RATE: 18 BRPM | SYSTOLIC BLOOD PRESSURE: 123 MMHG

## 2024-04-16 DIAGNOSIS — Z74.09 IMPAIRED FUNCTIONAL MOBILITY AND ACTIVITY TOLERANCE: ICD-10-CM

## 2024-04-16 DIAGNOSIS — M25.562 ACUTE PAIN OF LEFT KNEE: ICD-10-CM

## 2024-04-16 DIAGNOSIS — Z02.6 ENCOUNTER RELATED TO WORKER'S COMPENSATION CLAIM: Primary | ICD-10-CM

## 2024-04-16 DIAGNOSIS — M79.604 RIGHT LEG PAIN: ICD-10-CM

## 2024-04-16 DIAGNOSIS — M25.511 ACUTE PAIN OF BOTH SHOULDERS: ICD-10-CM

## 2024-04-16 DIAGNOSIS — S70.11XD CONTUSION OF RIGHT THIGH, SUBSEQUENT ENCOUNTER: ICD-10-CM

## 2024-04-16 DIAGNOSIS — M25.512 ACUTE PAIN OF BOTH SHOULDERS: ICD-10-CM

## 2024-04-16 DIAGNOSIS — M54.50 ACUTE BILATERAL LOW BACK PAIN WITHOUT SCIATICA: Primary | ICD-10-CM

## 2024-04-16 DIAGNOSIS — S39.012D LUMBOSACRAL STRAIN, SUBSEQUENT ENCOUNTER: ICD-10-CM

## 2024-04-16 DIAGNOSIS — M79.661 PAIN IN RIGHT LOWER LEG: ICD-10-CM

## 2024-04-16 DIAGNOSIS — M79.651 RIGHT THIGH PAIN: ICD-10-CM

## 2024-04-16 LAB
CTP QC/QA: YES
POC 10 PANEL DRUG SCREEN: NEGATIVE

## 2024-04-16 PROCEDURE — 99213 OFFICE O/P EST LOW 20 MIN: CPT | Mod: S$GLB,,, | Performed by: FAMILY MEDICINE

## 2024-04-16 PROCEDURE — 97530 THERAPEUTIC ACTIVITIES: CPT | Mod: PN,CQ

## 2024-04-16 PROCEDURE — 97112 NEUROMUSCULAR REEDUCATION: CPT | Mod: PN,CQ

## 2024-04-16 RX ORDER — LIDOCAINE 560 MG/1
PATCH PERCUTANEOUS; TOPICAL; TRANSDERMAL
Qty: 30 PATCH | Refills: 1 | Status: SHIPPED | OUTPATIENT
Start: 2024-04-16

## 2024-04-16 RX ORDER — METHOCARBAMOL 500 MG/1
500 TABLET, FILM COATED ORAL 2 TIMES DAILY PRN
Qty: 30 TABLET | Refills: 1 | Status: SHIPPED | OUTPATIENT
Start: 2024-04-16

## 2024-04-16 RX ORDER — NAPROXEN 500 MG/1
500 TABLET ORAL 2 TIMES DAILY
Qty: 30 TABLET | Refills: 1 | Status: SHIPPED | OUTPATIENT
Start: 2024-04-16

## 2024-04-16 NOTE — PROGRESS NOTES
"[facility]  Outpatient Physical Therapy Daily Treatment Note      Name: Aashish James  Clinic Number: [id number]  Visit Date: 4/16/2024    Therapy Diagnosis:   Encounter Diagnoses   Name Primary?    Acute bilateral low back pain without sciatica Yes    Right leg pain     Impaired functional mobility and activity tolerance        Physician: Gerhard Moy, *  Physician Orders: PT Eval and Treat  Medical Diagnosis from Referral:   S39.012A (ICD-10-CM) - Lumbosacral strain, initial encounter   M79.651 (ICD-10-CM) - Right thigh pain   M79.661 (ICD-10-CM) - Pain in right lower leg   S70.11XA (ICD-10-CM) - Contusion of right thigh, initial encounter      Evaluation Date: 3/20/2024  Authorization Period Expiration: 12/31/24  Plan of Care Expiration: 6/1/24                      Progress Update: 4/22/24                   Visit # / Visits authorized: 5 / 12                    FOTO: Visit #1 - 1/3     FOTO Due Visit 5 -  Follow up  FOTO Due Visit 10 - Follow up    Time In: 204p  FOTO next visit  Time Out: 230p  Total Billable Timed: 26 minutes  Total Billable Un-timed: 0 minutes    Precautions: Standard    Subjective     "I don't have my scripts.  They haven't renewed them.", MD appt in [place] at [facility], requested to leave early to go to that appt    Response to previous treatment: good   Functional change: none     Pain: 9/10, 9 LE's  Location: right back  and Lower Extremity       Objective     Aashish received therapeutic exercises to develop strength, endurance, ROM, flexibility, posture, and core stabilization for 0 minutes including:     Bike x 10 minutes, level 3  NOT PERFORMED     Aashish participated in neuromuscular re-education activities to improve: Balance, Coordination, Kinesthetic, Sense, Proprioception, and Posture for 16 minutes:    Hip 3 way x 10 Bilateral     Palloff press 10# x 20 Bilateral    NOT PERFORMED below  Lower trunk rotation x 20 Bilateral  Posterior pelvic tilt " x 30   Hip ADD Ball x 30   Bridges Green Theraband x 30   Sidelying clams Green Theraband x 20  Bilateral     Stand hip extension with Wedge x 20 bilateral     Hamstring stretch 3 x 30 sec   PhysioBall rollout x 20   Right Sciatic Glides with foot propped x 20      Shuttle Bilateral 37 x 20     Shuttle single leg 12# x 20     Gastroc stretch 3 x 30 sec -  slant board        THERAPEUTIC ACTIVITIES x 15 minutes to improve functional activities:    Precor lumbar extension 40# x 30  Precor hip adduction 65# x 30   Precor hip abduction 65# x 30    Patient Education and HEP     He was compliant with home exercise program.    Education provided:   - Continue with Home Exercise Program, stay as active as possible     Written Home Exercises Provided: Patient instructed to cont prior HEP.  Exercises were reviewed and Aashish was able to demonstrate them prior to the end of the session.  Aashish demonstrated fair  understanding of the education provided.     See EMR under Patient Instructions for exercises provided prior visit.    Assessment     Aashish continues to report high pain levels.  Decreased hip adductor flexibility. Limited activity level today due to needing to leave early.     Pt will continue to benefit from skilled outpatient physical therapy to address the deficits listed in the problem list box on initial evaluation, provide pt/family education and to maximize pt's level of independence in the home and community environment.     Aashish Is progressing well towards his goals.   Pt prognosis is Good.     Pt's spiritual, cultural and educational needs considered and pt agreeable to plan of care and goals.    Anticipated barriers to physical therapy: none    Goals:   SHORT TERM GOALS:  3 weeks  Progress  4/16/2024 Date met   Recent signs and systems trend is improving in order to progress towards LTG's. [] Met  [] Not Met  [x] Progressing     Patient will be independent with HEP in order to further progress and  return to maximal function. [] Met  [] Not Met  [x] Progressing     Pain rating at Worst: 5/10 in order to progress towards increased independence with activity. [] Met  [] Not Met  [x] Progressing     Patient will be able to correct postural deviations in sitting and standing, to decrease pain and promote postural awareness for injury prevention.  [] Met  [] Not Met  [x] Progressing        LONG TERM GOALS: 6 weeks  Progress  4/16/2024 Date met   Patient will return to normal ADL, recreational, and work related activities with less pain and limitation.  [] Met  [] Not Met  [x] Progressing     Patient will improve AROM to stated goals in order to return to maximal functional potential.  [] Met  [] Not Met  [x] Progressing     Patient will improve Strength to stated goals of appropriate musculature in order to improve functional independence.  [] Met  [] Not Met  [x] Progressing     Pain Rating at Best: 1/10 to improve Quality of Life.  [] Met  [] Not Met  [x] Progressing     Patient will meet predicted functional outcome (FOTO) score: 39% to increase self-worth & perceived functional ability. [] Met  [] Not Met  [x] Progressing     Patient will have met/partially met personal goal of: Lower back and leg pain and able to return to work  [] Met  [] Not Met  [x] Progressing        Plan     Continue with the plan of care established per initial evaluation    Betzy Griffith, PTA

## 2024-04-16 NOTE — PROGRESS NOTES
Subjective:      Patient ID: Aashish James is a 38 y.o. male.    Chief Complaint: Fall    04/16/2024   Patient's place of employment - [employer]  Patient's job title - /owner.   Date of Injury - 01/10/2024  Body part injured - leg and back  Current work status per last visit - Restrictions: No driving company vehicles  Improved, same, or worse - worsened   Pain Scale right now (1-10) -  8  Pt's request refill on Robaxin, Lidocaine patches and naproxen. Pt states that he's been going to physical therapy.           Patient's place of employment - [employer]  Patient's job title - /owner.   Date of Injury - 1/10/2024  Body part injured - leg and back  Current work status per last visit -No driving company vehicles   Improved, same, or worse - worse  Pain Scale right now (1-10) - 6  Pt states that he's having more pain in the left shoulder area, he's also taking Naproxen      Back Pain  This is a recurrent problem. The current episode started more than 1 month ago. The problem occurs constantly. The problem has been gradually worsening since onset. The pain is present in the sacro-iliac. The quality of the pain is described as shooting and aching. The pain does not radiate. The pain is at a severity of 8/10. The pain is moderate. The pain is The same all the time. The symptoms are aggravated by position and twisting. Stiffness is present All day. Associated symptoms include bladder incontinence, leg pain and numbness. He has tried heat for the symptoms. The treatment provided mild relief.       Genitourinary:  Positive for bladder incontinence.   Musculoskeletal:  Positive for back pain.   Neurological:  Positive for numbness.     Objective:     Physical Exam  Musculoskeletal:        Legs:           Normal gait  Musculoskeletal:        Arms:       Normal ROM of shoulders.       Musculoskeletal:      Lumbar back: Tenderness present. Negative right straight leg raise test and negative left  "straight leg raise test.        Back:         Legs:           Assessment:      1. Encounter related to worker's compensation claim    2. Lumbosacral strain, subsequent encounter    3. Right thigh pain    4. Pain in right lower leg    5. Contusion of right thigh, subsequent encounter    6. Acute pain of both shoulders    7. Acute pain of left knee      Plan:   PT today mentions left knee pain-- has not been mentioned at previous visits. Feels like "fluid in knee"   Overall, despite worsening subj pain scores- Pt mentions that PT is helping. States that he woke up this AM in a 9/10-- pt affect does not correlate with pain scale complaints.     Medications Ordered This Encounter   Medications    LIDOcaine 4 % PtMd     Sig: Apply 1 patch topically to affected area 2 x per day     Dispense:  30 patch     Refill:  1    methocarbamoL (ROBAXIN) 500 MG Tab     Sig: Take 1 tablet (500 mg total) by mouth 2 (two) times daily as needed.     Dispense:  30 tablet     Refill:  1    naproxen (NAPROSYN) 500 MG tablet     Sig: Take 1 tablet (500 mg total) by mouth 2 (two) times daily.     Dispense:  30 tablet     Refill:  1     Patient Instructions: Daily home exercises/warm soaks, Continue Physical Therapy   Restrictions: No driving company vehicles  No follow-ups on file.      "

## 2024-04-16 NOTE — LETTER
[facility] - [place] [address], [address]  [facility] [address]  Phone: [phone]  Fax: [phone]  [facility]: [phone]    Pt Name: Aashish Villeda Date: 01/10/2024   Employee ID: [id number] Date of First Treatment: 04/16/2024   Company: [employer] reference to record [id number]      Appointment Time: 03:15 PM Arrived: 315   Provider: Gerhard Moy MD Time Out:415     Office Treatment:   1. Encounter related to worker's compensation claim    2. Lumbosacral strain, subsequent encounter    3. Right thigh pain    4. Pain in right lower leg    5. Contusion of right thigh, subsequent encounter    6. Acute pain of both shoulders      Medications Ordered This Encounter   Medications    LIDOcaine 4 % PtMd    methocarbamoL (ROBAXIN) 500 MG Tab    naproxen (NAPROSYN) 500 MG tablet      Patient Instructions: Daily home exercises/warm soaks, Continue Physical Therapy    Restrictions: No driving company vehicles     Return Appointment: 5/15/2024

## 2024-04-16 NOTE — TELEPHONE ENCOUNTER
Pt called today about his appointment and that he needed a form completed.  He will try to get to the clinic before 4p.

## 2024-04-23 ENCOUNTER — CLINICAL SUPPORT (OUTPATIENT)
Dept: REHABILITATION | Facility: HOSPITAL | Age: 39
End: 2024-04-23
Payer: COMMERCIAL

## 2024-04-23 DIAGNOSIS — M79.604 RIGHT LEG PAIN: ICD-10-CM

## 2024-04-23 DIAGNOSIS — Z74.09 IMPAIRED FUNCTIONAL MOBILITY AND ACTIVITY TOLERANCE: ICD-10-CM

## 2024-04-23 DIAGNOSIS — M54.50 ACUTE BILATERAL LOW BACK PAIN WITHOUT SCIATICA: Primary | ICD-10-CM

## 2024-04-23 PROCEDURE — 97530 THERAPEUTIC ACTIVITIES: CPT | Mod: PN,CQ

## 2024-04-23 PROCEDURE — 97112 NEUROMUSCULAR REEDUCATION: CPT | Mod: PN,CQ

## 2024-04-23 NOTE — PROGRESS NOTES
"[facility]  Outpatient Physical Therapy Daily Treatment Note      Name: Aashish James  Clinic Number: [id number]  Visit Date: 4/23/2024    Therapy Diagnosis:   Encounter Diagnoses   Name Primary?    Acute bilateral low back pain without sciatica Yes    Right leg pain     Impaired functional mobility and activity tolerance        Physician: Gerhard Moy, *  Physician Orders: PT Eval and Treat  Medical Diagnosis from Referral:   S39.012A (ICD-10-CM) - Lumbosacral strain, initial encounter   M79.651 (ICD-10-CM) - Right thigh pain   M79.661 (ICD-10-CM) - Pain in right lower leg   S70.11XA (ICD-10-CM) - Contusion of right thigh, initial encounter      Evaluation Date: 3/20/2024  Authorization Period Expiration: 12/31/24  Plan of Care Expiration: 6/1/24                      Progress Update: 4/22/24                   Visit # / Visits authorized: 6 / 12                    FOTO: Visit #1 - 2/3     FOTO Due Visit 5 - 4/23/2024  FOTO Due Visit 10 - Follow up    Time In: 300p   Time Out: 353p  Total Billable Timed: 53 minutes  Total Billable Un-timed: 0 minutes    Precautions: Standard    Subjective     "I'm doing a little better.  I have some pain pills in my system."    Response to previous treatment: good   Functional change: none     Pain: 6/10  Location: right back  and Lower Extremity       Objective     Aashish received therapeutic exercises to develop strength, endurance, ROM, flexibility, posture, and core stabilization for 0 minutes including:     Bike x 10 minutes, level 3  NOT PERFORMED     Aashish participated in neuromuscular re-education activities to improve: Balance, Coordination, Kinesthetic, Sense, Proprioception, and Posture for 38 minutes:    Hip 3 way x 10 Bilateral   Gastroc stretch 3 x 30 sec -  slant board     Palloff press 10# x 20 Bilateral    Lower trunk rotation x 20 Bilateral  Posterior pelvic tilt x 30   Hip ADD Ball x 30   Bridges Green Theraband x 30 "   Sidelying clams Green Theraband x 20  Bilateral     Stand hip extension with Wedge x 20 bilateral     Hamstring stretch 3 x 30 sec NOT PERFORMED   PhysioBall rollout x 20 NOT PERFORMED   Right Sciatic Glides with foot propped x 20  NOT PERFORMED      Shuttle Bilateral 50# 2 x 15     Shuttle single leg 37# x 20 each    THERAPEUTIC ACTIVITIES x 15 minutes to improve functional activities:    Precor lumbar extension 40# x 30  NOT PERFORMED   Precor hip adduction 65# x 30    Precor hip abduction 65# x 30  NOT PERFORMED     Patient Education and HEP     He was compliant with home exercise program.    Education provided:   - Continue with Home Exercise Program, stay as active as possible     Written Home Exercises Provided: Patient instructed to cont prior HEP.  Exercises were reviewed and Aashish was able to demonstrate them prior to the end of the session.  Aashish demonstrated fair  understanding of the education provided.     See EMR under Patient Instructions for exercises provided prior visit.    Assessment     Aashish is able to perform sit<>stand without difficulty.  He ambulates without antalgia.  Good overall tolerance for PRE's, some fatigue reported.  Decreased FOTO scores today.      Pt will continue to benefit from skilled outpatient physical therapy to address the deficits listed in the problem list box on initial evaluation, provide pt/family education and to maximize pt's level of independence in the home and community environment.     Aashish Is progressing well towards his goals.   Pt prognosis is Good.     Pt's spiritual, cultural and educational needs considered and pt agreeable to plan of care and goals.    Anticipated barriers to physical therapy: none    Goals:   SHORT TERM GOALS:  3 weeks  Progress  4/23/2024 Date met   Recent signs and systems trend is improving in order to progress towards LTG's. [] Met  [] Not Met  [x] Progressing     Patient will be independent with HEP in order to further  progress and return to maximal function. [] Met  [] Not Met  [x] Progressing     Pain rating at Worst: 5/10 in order to progress towards increased independence with activity. [] Met  [] Not Met  [x] Progressing     Patient will be able to correct postural deviations in sitting and standing, to decrease pain and promote postural awareness for injury prevention.  [] Met  [] Not Met  [x] Progressing        LONG TERM GOALS: 6 weeks  Progress  4/23/2024 Date met   Patient will return to normal ADL, recreational, and work related activities with less pain and limitation.  [] Met  [] Not Met  [x] Progressing     Patient will improve AROM to stated goals in order to return to maximal functional potential.  [] Met  [] Not Met  [x] Progressing     Patient will improve Strength to stated goals of appropriate musculature in order to improve functional independence.  [] Met  [] Not Met  [x] Progressing     Pain Rating at Best: 1/10 to improve Quality of Life.  [] Met  [] Not Met  [x] Progressing     Patient will meet predicted functional outcome (FOTO) score: 39% to increase self-worth & perceived functional ability. [] Met  [] Not Met  [x] Progressing     Patient will have met/partially met personal goal of: Lower back and leg pain and able to return to work  [] Met  [] Not Met  [x] Progressing        Plan     Continue with the plan of care established per initial evaluation    Betzy Griffith, PTA

## 2024-04-25 ENCOUNTER — CLINICAL SUPPORT (OUTPATIENT)
Dept: REHABILITATION | Facility: HOSPITAL | Age: 39
End: 2024-04-25
Payer: COMMERCIAL

## 2024-04-25 DIAGNOSIS — M79.604 RIGHT LEG PAIN: ICD-10-CM

## 2024-04-25 DIAGNOSIS — Z74.09 IMPAIRED FUNCTIONAL MOBILITY AND ACTIVITY TOLERANCE: ICD-10-CM

## 2024-04-25 DIAGNOSIS — M54.50 ACUTE BILATERAL LOW BACK PAIN WITHOUT SCIATICA: Primary | ICD-10-CM

## 2024-04-25 PROCEDURE — 97112 NEUROMUSCULAR REEDUCATION: CPT | Mod: PN,CQ

## 2024-04-25 PROCEDURE — 97530 THERAPEUTIC ACTIVITIES: CPT | Mod: PN,CQ

## 2024-04-25 PROCEDURE — 97110 THERAPEUTIC EXERCISES: CPT | Mod: PN,CQ

## 2024-04-25 NOTE — PROGRESS NOTES
"[facility]  Outpatient Physical Therapy Daily Treatment Note      Name: Aashish James  Clinic Number: [id number]  Visit Date: 4/25/2024    Therapy Diagnosis:   Encounter Diagnoses   Name Primary?    Acute bilateral low back pain without sciatica Yes    Right leg pain     Impaired functional mobility and activity tolerance        Physician: Gerhard Moy, *  Physician Orders: PT Eval and Treat  Medical Diagnosis from Referral:   S39.012A (ICD-10-CM) - Lumbosacral strain, initial encounter   M79.651 (ICD-10-CM) - Right thigh pain   M79.661 (ICD-10-CM) - Pain in right lower leg   S70.11XA (ICD-10-CM) - Contusion of right thigh, initial encounter      Evaluation Date: 3/20/2024  Authorization Period Expiration: 12/31/24  Plan of Care Expiration: 6/1/24                      Progress Update: 4/22/24                   Visit # / Visits authorized: 7/ 12                    FOTO: Visit #1 - 2/3     FOTO Due Visit 5 - 4/23/2024  FOTO Due Visit 10 - Follow up    Time In: 200p   Time Out: 259p  Total Billable Timed: 59 minutes  Total Billable Un-timed: 0 minutes    Precautions: Standard    Subjective     Pt's wife walked up to entrance of the therapy gym: "Can y'all take it easy on my  today.  He is complaining of pain today.  I am just putting it out there."  Pt reports he has not taken his pain meds today, Low back pain.    Response to previous treatment: good   Functional change: none     Pain: 8/10  Location: Low Back     Objective     Aashish received therapeutic exercises to develop strength, endurance, ROM, flexibility, posture, and core stabilization for 10 minutes including:     Bike x 10 minutes, level 3  NOT PERFORMED   Nustep x 10 minutes    Aashish participated in neuromuscular re-education activities to improve: Balance, Coordination, Kinesthetic, Sense, Proprioception, and Posture for 34 minutes:    Hip 3 way x 10 Bilateral   Gastroc stretch 3 x 30 sec -  slant board "     Palloff press 10# x 20 Bilateral    Lower trunk rotation x 20 Bilateral  Posterior pelvic tilt x 30   Hip ADD Ball x 30  NOT PERFORMED   Bridges Green Theraband x 30   Sidelying clams Green Theraband x 20  Bilateral     Stand hip extension with Wedge x 20 bilateral     Hamstring stretch 3 x 30 sec NOT PERFORMED   PhysioBall rollout x 20 NOT PERFORMED   Right Sciatic Glides with foot propped x 20  NOT PERFORMED      Shuttle Bilateral 50# 2 x 15    NOT PERFORMED   Shuttle single leg 37# x 20 each NOT PERFORMED     THERAPEUTIC ACTIVITIES x 15 minutes to improve functional activities:    Precor lumbar extension 50# x 30    Precor hip adduction 65# x 30    Precor hip abduction 65# x 30      Patient Education and HEP     He was compliant with home exercise program.    Education provided:   - Continue with Home Exercise Program, stay as active as possible     Written Home Exercises Provided: Patient instructed to cont prior HEP.  Exercises were reviewed and Aashish was able to demonstrate them prior to the end of the session.  Aashish demonstrated fair  understanding of the education provided.     See EMR under Patient Instructions for exercises provided prior visit.    Assessment     Aashish continues to report high levels of pain.  He is able to perform all therex, but pt reports he needs occasional short rest breaks.  Pain level 7/10 at conclusion of therapy.      Pt will continue to benefit from skilled outpatient physical therapy to address the deficits listed in the problem list box on initial evaluation, provide pt/family education and to maximize pt's level of independence in the home and community environment.     Aashish Is progressing well towards his goals.   Pt prognosis is Good.     Pt's spiritual, cultural and educational needs considered and pt agreeable to plan of care and goals.    Anticipated barriers to physical therapy: none    Goals:   SHORT TERM GOALS:  3 weeks  Progress  4/25/2024 Date met    Recent signs and systems trend is improving in order to progress towards LTG's. [] Met  [] Not Met  [x] Progressing     Patient will be independent with HEP in order to further progress and return to maximal function. [] Met  [] Not Met  [x] Progressing     Pain rating at Worst: 5/10 in order to progress towards increased independence with activity. [] Met  [] Not Met  [x] Progressing     Patient will be able to correct postural deviations in sitting and standing, to decrease pain and promote postural awareness for injury prevention.  [] Met  [] Not Met  [x] Progressing        LONG TERM GOALS: 6 weeks  Progress  4/25/2024 Date met   Patient will return to normal ADL, recreational, and work related activities with less pain and limitation.  [] Met  [] Not Met  [x] Progressing     Patient will improve AROM to stated goals in order to return to maximal functional potential.  [] Met  [] Not Met  [x] Progressing     Patient will improve Strength to stated goals of appropriate musculature in order to improve functional independence.  [] Met  [] Not Met  [x] Progressing     Pain Rating at Best: 1/10 to improve Quality of Life.  [] Met  [] Not Met  [x] Progressing     Patient will meet predicted functional outcome (FOTO) score: 39% to increase self-worth & perceived functional ability. [] Met  [] Not Met  [x] Progressing     Patient will have met/partially met personal goal of: Lower back and leg pain and able to return to work  [] Met  [] Not Met  [x] Progressing        Plan     Continue with the plan of care established per initial evaluation    Betzy Griffith, PTA

## 2024-04-30 ENCOUNTER — CLINICAL SUPPORT (OUTPATIENT)
Dept: REHABILITATION | Facility: HOSPITAL | Age: 39
End: 2024-04-30
Payer: COMMERCIAL

## 2024-04-30 DIAGNOSIS — M54.50 ACUTE BILATERAL LOW BACK PAIN WITHOUT SCIATICA: Primary | ICD-10-CM

## 2024-04-30 DIAGNOSIS — Z74.09 IMPAIRED FUNCTIONAL MOBILITY AND ACTIVITY TOLERANCE: ICD-10-CM

## 2024-04-30 DIAGNOSIS — M79.604 RIGHT LEG PAIN: ICD-10-CM

## 2024-04-30 PROCEDURE — 97530 THERAPEUTIC ACTIVITIES: CPT | Mod: PN

## 2024-04-30 PROCEDURE — 97112 NEUROMUSCULAR REEDUCATION: CPT | Mod: PN

## 2024-04-30 PROCEDURE — 97110 THERAPEUTIC EXERCISES: CPT | Mod: PN

## 2024-04-30 NOTE — PROGRESS NOTES
[facility]  Outpatient Physical Therapy Daily Treatment Note      Name: Aashish James  Clinic Number: [id number]  Visit Date: 4/30/2024    Therapy Diagnosis:   Encounter Diagnoses   Name Primary?    Acute bilateral low back pain without sciatica Yes    Right leg pain     Impaired functional mobility and activity tolerance        Physician: Gerhard Moy, *  Physician Orders: PT Eval and Treat  Medical Diagnosis from Referral:   S39.012A (ICD-10-CM) - Lumbosacral strain, initial encounter   M79.651 (ICD-10-CM) - Right thigh pain   M79.661 (ICD-10-CM) - Pain in right lower leg   S70.11XA (ICD-10-CM) - Contusion of right thigh, initial encounter      Evaluation Date: 3/20/2024  Authorization Period Expiration: 12/31/24  Plan of Care Expiration: 6/1/24                      Progress Update: 4/22/24                   Visit # / Visits authorized: 8/ 12                    FOTO: Visit #1 - 2/3     FOTO Due Visit 5 - 4/23/2024  FOTO Due Visit 10 - Follow up    Time In: 300p   Time Out: 359p  Total Billable Timed: 59 minutes  Total Billable Un-timed: 0 minutes    Precautions: Standard    Subjective     Pt's reports he is still in pain     Response to previous treatment: good   Functional change: none     Pain: 8/10  Location: Low Back     Objective     Aashish received therapeutic exercises to develop strength, endurance, ROM, flexibility, posture, and core stabilization for 10 minutes including:     Bike x 10 minutes, level 3    Nustep x 10 minutesNT     Aashish participated in neuromuscular re-education activities to improve: Balance, Coordination, Kinesthetic, Sense, Proprioception, and Posture for 34 minutes:    Hip 3 way x 10 Bilateral   Gastroc stretch 3 x 30 sec -  slant board     Palloff press 10# x 20 Bilateral    Lower trunk rotation x 20 Bilateral  Posterior pelvic tilt x 30   Hip ADD Ball x 30     Bridges Green Theraband x 30   Sidelying clams Green Theraband x 20  Bilateral      Stand hip extension with Wedge x 20 bilateral     Hamstring stretch 3 x 30 sec NOT PERFORMED   PhysioBall rollout x 20 NOT PERFORMED   Right Sciatic Glides with foot propped x 20  NOT PERFORMED      Shuttle Bilateral 50# 2 x 15       Shuttle single leg 37# x 20 each     THERAPEUTIC ACTIVITIES x 15 minutes to improve functional activities:    Precor lumbar extension 50# x 30    Precor hip adduction 65# x 30    Precor hip abduction 65# x 30      Patient Education and HEP     He was compliant with home exercise program.    Education provided:   - Continue with Home Exercise Program, stay as active as possible     Written Home Exercises Provided: Patient instructed to cont prior HEP.  Exercises were reviewed and Aashish was able to demonstrate them prior to the end of the session.  Aashish demonstrated fair  understanding of the education provided.     See EMR under Patient Instructions for exercises provided prior visit.    Assessment     Aashish continues to report high levels of pain.  He is able to do all of his exercises with some rest and slow pace   Pt will continue to benefit from skilled outpatient physical therapy to address the deficits listed in the problem list box on initial evaluation, provide pt/family education and to maximize pt's level of independence in the home and community environment.     Aashish Is progressing well towards his goals.   Pt prognosis is Good.     Pt's spiritual, cultural and educational needs considered and pt agreeable to plan of care and goals.    Anticipated barriers to physical therapy: none    Goals:   SHORT TERM GOALS:  3 weeks  Progress  4/30/2024 Date met   Recent signs and systems trend is improving in order to progress towards LTG's. [] Met  [] Not Met  [x] Progressing     Patient will be independent with HEP in order to further progress and return to maximal function. [] Met  [] Not Met  [x] Progressing     Pain rating at Worst: 5/10 in order to progress towards  increased independence with activity. [] Met  [] Not Met  [x] Progressing     Patient will be able to correct postural deviations in sitting and standing, to decrease pain and promote postural awareness for injury prevention.  [] Met  [] Not Met  [x] Progressing        LONG TERM GOALS: 6 weeks  Progress  4/30/2024 Date met   Patient will return to normal ADL, recreational, and work related activities with less pain and limitation.  [] Met  [] Not Met  [x] Progressing     Patient will improve AROM to stated goals in order to return to maximal functional potential.  [] Met  [] Not Met  [x] Progressing     Patient will improve Strength to stated goals of appropriate musculature in order to improve functional independence.  [] Met  [] Not Met  [x] Progressing     Pain Rating at Best: 1/10 to improve Quality of Life.  [] Met  [] Not Met  [x] Progressing     Patient will meet predicted functional outcome (FOTO) score: 39% to increase self-worth & perceived functional ability. [] Met  [] Not Met  [x] Progressing     Patient will have met/partially met personal goal of: Lower back and leg pain and able to return to work  [] Met  [] Not Met  [x] Progressing        Plan     Continue with the plan of care established per initial evaluation    Tyler Painting, PT

## 2024-05-15 ENCOUNTER — OFFICE VISIT (OUTPATIENT)
Dept: URGENT CARE | Facility: CLINIC | Age: 39
End: 2024-05-15
Payer: OTHER MISCELLANEOUS

## 2024-05-15 VITALS
WEIGHT: 201 LBS | RESPIRATION RATE: 16 BRPM | TEMPERATURE: 99 F | HEART RATE: 80 BPM | SYSTOLIC BLOOD PRESSURE: 108 MMHG | BODY MASS INDEX: 28.77 KG/M2 | DIASTOLIC BLOOD PRESSURE: 57 MMHG | OXYGEN SATURATION: 97 % | HEIGHT: 70 IN

## 2024-05-15 DIAGNOSIS — M25.511 ACUTE PAIN OF BOTH SHOULDERS: ICD-10-CM

## 2024-05-15 DIAGNOSIS — S70.11XD CONTUSION OF RIGHT THIGH, SUBSEQUENT ENCOUNTER: ICD-10-CM

## 2024-05-15 DIAGNOSIS — M79.661 PAIN IN RIGHT LOWER LEG: ICD-10-CM

## 2024-05-15 DIAGNOSIS — Z02.6 ENCOUNTER RELATED TO WORKER'S COMPENSATION CLAIM: Primary | ICD-10-CM

## 2024-05-15 DIAGNOSIS — M79.651 RIGHT THIGH PAIN: ICD-10-CM

## 2024-05-15 DIAGNOSIS — M25.512 ACUTE PAIN OF BOTH SHOULDERS: ICD-10-CM

## 2024-05-15 DIAGNOSIS — M25.562 ACUTE PAIN OF LEFT KNEE: ICD-10-CM

## 2024-05-15 DIAGNOSIS — S39.012D LUMBOSACRAL STRAIN, SUBSEQUENT ENCOUNTER: ICD-10-CM

## 2024-05-15 PROCEDURE — 99213 OFFICE O/P EST LOW 20 MIN: CPT | Mod: S$GLB,,, | Performed by: FAMILY MEDICINE

## 2024-05-15 RX ORDER — NAPROXEN 500 MG/1
500 TABLET ORAL 2 TIMES DAILY
Qty: 30 TABLET | Refills: 1 | Status: SHIPPED | OUTPATIENT
Start: 2024-05-15

## 2024-05-15 RX ORDER — LIDOCAINE 50 MG/G
1 PATCH TOPICAL DAILY
Qty: 15 PATCH | Refills: 1 | Status: SHIPPED | OUTPATIENT
Start: 2024-05-15

## 2024-05-15 RX ORDER — METHOCARBAMOL 500 MG/1
500 TABLET, FILM COATED ORAL 2 TIMES DAILY PRN
Qty: 30 TABLET | Refills: 1 | Status: SHIPPED | OUTPATIENT
Start: 2024-05-15
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
## 2024-05-15 NOTE — LETTER
[facility] [place] [address], [address]  [facility] [address]  Phone: [phone]  Fax: [phone]  [facility]: [phone]    Pt Name: Aashish Villeda Date: 01/10/2024   Employee ID: [id number] Date of  Treatment: 05/15/2024   Company: Networked reference to record EEP [id number][employer]      Appointment Time: 03:15 PM Arrived: 330   Provider: Gerhard Moy MD Time Out:400     Office Treatment:   1. Encounter related to worker's compensation claim    2. Lumbosacral strain, subsequent encounter    3. Right thigh pain    4. Pain in right lower leg    5. Contusion of right thigh, subsequent encounter    6. Acute pain of both shoulders    7. Acute pain of left knee      Medications Ordered This Encounter   Medications    LIDOcaine (LIDODERM) 5 %    methocarbamoL (ROBAXIN) 500 MG Tab    naproxen (NAPROSYN) 500 MG tablet      Patient Instructions: PT to be scheduled once authorized, Referral to specialist to be scheduled, once authorized, Continue Physical Therapy, Daily home exercises/warm soaks, Attention not to aggravate affected area    Restrictions: No driving company vehicles     Return Appointment: as needed to f/u with specialist.

## 2024-05-16 ENCOUNTER — TELEPHONE (OUTPATIENT)
Dept: SPINE | Facility: CLINIC | Age: 39
End: 2024-05-16

## 2024-05-21 ENCOUNTER — CLINICAL SUPPORT (OUTPATIENT)
Dept: REHABILITATION | Facility: HOSPITAL | Age: 39
End: 2024-05-21
Attending: FAMILY MEDICINE
Payer: COMMERCIAL

## 2024-05-21 DIAGNOSIS — M79.604 RIGHT LEG PAIN: ICD-10-CM

## 2024-05-21 DIAGNOSIS — Z74.09 IMPAIRED FUNCTIONAL MOBILITY AND ACTIVITY TOLERANCE: ICD-10-CM

## 2024-05-21 DIAGNOSIS — M54.50 ACUTE BILATERAL LOW BACK PAIN WITHOUT SCIATICA: Primary | ICD-10-CM

## 2024-05-21 PROCEDURE — 97110 THERAPEUTIC EXERCISES: CPT | Mod: PN

## 2024-05-21 PROCEDURE — 97530 THERAPEUTIC ACTIVITIES: CPT | Mod: PN

## 2024-05-21 PROCEDURE — 97112 NEUROMUSCULAR REEDUCATION: CPT | Mod: PN

## 2024-05-21 NOTE — PROGRESS NOTES
[facility]  Outpatient Physical Therapy Daily Treatment Note      Name: Aashish James  Clinic Number: [id number]  Visit Date: 5/21/2024    Therapy Diagnosis:   Encounter Diagnoses   Name Primary?    Acute bilateral low back pain without sciatica Yes    Right leg pain     Impaired functional mobility and activity tolerance        Physician: Gerhard Moy, *  Physician Orders: PT Eval and Treat  Medical Diagnosis from Referral:   S39.012A (ICD-10-CM) - Lumbosacral strain, initial encounter   M79.651 (ICD-10-CM) - Right thigh pain   M79.661 (ICD-10-CM) - Pain in right lower leg   S70.11XA (ICD-10-CM) - Contusion of right thigh, initial encounter      Evaluation Date: 3/20/2024  Authorization Period Expiration: 12/31/24  Plan of Care Expiration: 6/1/24                      Progress Update: 4/22/24                   Visit # / Visits authorized: 8/ 12                    FOTO: Visit #1 - 2/3     FOTO Due Visit 5 - 4/23/2024  FOTO Due Visit 10 - Follow up    Time In: 300p   Time Out: 359p  Total Billable Timed: 59 minutes  Total Billable Un-timed: 0 minutes    Precautions: Standard    Subjective     Pt's reports he is still in pain but feels he is getting better, did feel like therapy was helping and is glad he gets to come back     Response to previous treatment: good   Functional change: none     Pain: 6/10  Location: Low Back     Objective     Aashish received therapeutic exercises to develop strength, endurance, ROM, flexibility, posture, and core stabilization for 10 minutes including:     Bike x 10 minutes, level 3    Nustep x 10 minutesNT     Aashish participated in neuromuscular re-education activities to improve: Balance, Coordination, Kinesthetic, Sense, Proprioception, and Posture for 34 minutes:    Lower trunk rotation x 20 Bilateral  Posterior pelvic tilt x 30   Hip ADD Ball x 30     Bridges Green Theraband x 30   Sidelying clams Green Theraband x 20  Bilateral     Hip 3 way  x 10 Bilateral   Gastroc stretch 3 x 30 sec -  slant board     Palloff press 10# x 20 Bilateral  + Cable Column Rows 10#   +Cable Column Extensions 10#   Stand hip extension with Wedge x 20 bilateral      Shuttle Bilateral 50# 2 x 15       Shuttle single leg 37# x 20 each   + Shuttle Kick Backs     THERAPEUTIC ACTIVITIES x 15 minutes to improve functional activities:    Precor lumbar extension 50# x 30    Precor Torso   Precor hip adduction 65# x 30    Precor hip abduction 65# x 30    + Sled Push     Patient Education and HEP     He was compliant with home exercise program.    Education provided:   - Continue with Home Exercise Program, stay as active as possible     Written Home Exercises Provided: Patient instructed to cont prior HEP.  Exercises were reviewed and Aashish was able to demonstrate them prior to the end of the session.  Aashish demonstrated fair  understanding of the education provided.     See EMR under Patient Instructions for exercises provided prior visit.    Assessment     Aashish continues to to have pain and difficulty with activity. His pain is decreasing slowly and his motions are better with less guarding   Pt will continue to benefit from skilled outpatient physical therapy to address the deficits listed in the problem list box on initial evaluation, provide pt/family education and to maximize pt's level of independence in the home and community environment.     Aashish Is progressing well towards his goals.   Pt prognosis is Good.     Pt's spiritual, cultural and educational needs considered and pt agreeable to plan of care and goals.    Anticipated barriers to physical therapy: none    Goals:   SHORT TERM GOALS:  3 weeks  Progress  5/21/2024 Date met   Recent signs and systems trend is improving in order to progress towards LTG's. [] Met  [] Not Met  [x] Progressing     Patient will be independent with HEP in order to further progress and return to maximal function. [] Met  [] Not  Met  [x] Progressing     Pain rating at Worst: 5/10 in order to progress towards increased independence with activity. [] Met  [] Not Met  [x] Progressing     Patient will be able to correct postural deviations in sitting and standing, to decrease pain and promote postural awareness for injury prevention.  [] Met  [] Not Met  [x] Progressing        LONG TERM GOALS: 6 weeks  Progress  5/21/2024 Date met   Patient will return to normal ADL, recreational, and work related activities with less pain and limitation.  [] Met  [] Not Met  [x] Progressing     Patient will improve AROM to stated goals in order to return to maximal functional potential.  [] Met  [] Not Met  [x] Progressing     Patient will improve Strength to stated goals of appropriate musculature in order to improve functional independence.  [] Met  [] Not Met  [x] Progressing     Pain Rating at Best: 1/10 to improve Quality of Life.  [] Met  [] Not Met  [x] Progressing     Patient will meet predicted functional outcome (FOTO) score: 39% to increase self-worth & perceived functional ability. [] Met  [] Not Met  [x] Progressing     Patient will have met/partially met personal goal of: Lower back and leg pain and able to return to work  [] Met  [] Not Met  [x] Progressing        Plan     Continue with the plan of care established per initial evaluation    Tyler Painting, PT

## 2024-05-23 ENCOUNTER — CLINICAL SUPPORT (OUTPATIENT)
Dept: REHABILITATION | Facility: HOSPITAL | Age: 39
End: 2024-05-23
Attending: FAMILY MEDICINE
Payer: COMMERCIAL

## 2024-05-23 DIAGNOSIS — Z74.09 IMPAIRED FUNCTIONAL MOBILITY AND ACTIVITY TOLERANCE: ICD-10-CM

## 2024-05-23 DIAGNOSIS — M54.50 ACUTE BILATERAL LOW BACK PAIN WITHOUT SCIATICA: Primary | ICD-10-CM

## 2024-05-23 DIAGNOSIS — M79.604 RIGHT LEG PAIN: ICD-10-CM

## 2024-05-23 PROCEDURE — 97112 NEUROMUSCULAR REEDUCATION: CPT | Mod: PN

## 2024-05-23 PROCEDURE — 97530 THERAPEUTIC ACTIVITIES: CPT | Mod: PN

## 2024-05-23 PROCEDURE — 97110 THERAPEUTIC EXERCISES: CPT | Mod: PN

## 2024-05-23 NOTE — PROGRESS NOTES
[facility]  Outpatient Physical Therapy Daily Treatment Note      Name: Aashish James  Clinic Number: [id number]  Visit Date: 5/23/2024    Therapy Diagnosis:   Encounter Diagnoses   Name Primary?    Acute bilateral low back pain without sciatica Yes    Right leg pain     Impaired functional mobility and activity tolerance        Physician: Gerhard Moy, *  Physician Orders: PT Eval and Treat  Medical Diagnosis from Referral:   S39.012A (ICD-10-CM) - Lumbosacral strain, initial encounter   M79.651 (ICD-10-CM) - Right thigh pain   M79.661 (ICD-10-CM) - Pain in right lower leg   S70.11XA (ICD-10-CM) - Contusion of right thigh, initial encounter      Evaluation Date: 3/20/2024  Authorization Period Expiration: 12/31/24  Plan of Care Expiration: 6/1/24                      Progress Update: 4/22/24                   Visit # / Visits authorized: 10/ 12                    FOTO: Visit #1 - 2/3     FOTO Due Visit 5 - 4/23/2024  FOTO Due Visit 10 - Follow up    Time In: 300p   Time Out: 359p  Total Billable Timed: 59 minutes  Total Billable Un-timed: 0 minutes    Precautions: Standard    Subjective     Pt's reports he was ok after last visit     Response to previous treatment: good   Functional change: none     Pain: 6/10  Location: Low Back     Objective     Aashish received therapeutic exercises to develop strength, endurance, ROM, flexibility, posture, and core stabilization for 10 minutes including:     Bike x 10 minutes, level 3    Nustep x 10 minutesNT     Aashish participated in neuromuscular re-education activities to improve: Balance, Coordination, Kinesthetic, Sense, Proprioception, and Posture for 34 minutes:    Lower trunk rotation x 20 Bilateral  Posterior pelvic tilt x 30   Hip ADD Ball x 30     Bridges Green Theraband x 30   Sidelying clams Green Theraband x 20  Bilateral     Hip 3 way x 10 Bilateral   Gastroc stretch 3 x 30 sec -  slant board     Palloff press 10# x 20  Bilateral  + Cable Column Rows 10#   +Cable Column Extensions 10#   Stand hip extension with Wedge x 20 bilateral      Shuttle Bilateral 50# 2 x 15       Shuttle single leg 37# x 20 each   + Shuttle Kick Backs 12#     THERAPEUTIC ACTIVITIES x 15 minutes to improve functional activities:    Precor lumbar extension 50# x 30    Precor Torso 25# x 20 each   Precor hip adduction 65# x 30    Precor hip abduction 65# x 30    + Sled Push     Patient Education and HEP     He was compliant with home exercise program.    Education provided:   - Continue with Home Exercise Program, stay as active as possible     Written Home Exercises Provided: Patient instructed to cont prior HEP.  Exercises were reviewed and Aashish was able to demonstrate them prior to the end of the session.  Aashish demonstrated fair  understanding of the education provided.     See EMR under Patient Instructions for exercises provided prior visit.    Assessment     Aashish is getting better, he was able to tolerate the addition of some more challenging exercise today.  His motion and quality of movement are better  Pt will continue to benefit from skilled outpatient physical therapy to address the deficits listed in the problem list box on initial evaluation, provide pt/family education and to maximize pt's level of independence in the home and community environment.     Aashish Is progressing well towards his goals.   Pt prognosis is Good.     Pt's spiritual, cultural and educational needs considered and pt agreeable to plan of care and goals.    Anticipated barriers to physical therapy: none    Goals:   SHORT TERM GOALS:  3 weeks  Progress  5/23/2024 Date met   Recent signs and systems trend is improving in order to progress towards LTG's. [] Met  [] Not Met  [x] Progressing     Patient will be independent with HEP in order to further progress and return to maximal function. [] Met  [] Not Met  [x] Progressing     Pain rating at Worst: 5/10 in order to  progress towards increased independence with activity. [] Met  [] Not Met  [x] Progressing     Patient will be able to correct postural deviations in sitting and standing, to decrease pain and promote postural awareness for injury prevention.  [] Met  [] Not Met  [x] Progressing        LONG TERM GOALS: 6 weeks  Progress  5/23/2024 Date met   Patient will return to normal ADL, recreational, and work related activities with less pain and limitation.  [] Met  [] Not Met  [x] Progressing     Patient will improve AROM to stated goals in order to return to maximal functional potential.  [] Met  [] Not Met  [x] Progressing     Patient will improve Strength to stated goals of appropriate musculature in order to improve functional independence.  [] Met  [] Not Met  [x] Progressing     Pain Rating at Best: 1/10 to improve Quality of Life.  [] Met  [] Not Met  [x] Progressing     Patient will meet predicted functional outcome (FOTO) score: 39% to increase self-worth & perceived functional ability. [] Met  [] Not Met  [x] Progressing     Patient will have met/partially met personal goal of: Lower back and leg pain and able to return to work  [] Met  [] Not Met  [x] Progressing        Plan     Continue with the plan of care established per initial evaluation    Tyler Painting, PT

## 2024-05-30 ENCOUNTER — CLINICAL SUPPORT (OUTPATIENT)
Dept: REHABILITATION | Facility: HOSPITAL | Age: 39
End: 2024-05-30
Payer: COMMERCIAL

## 2024-05-30 DIAGNOSIS — M79.604 RIGHT LEG PAIN: ICD-10-CM

## 2024-05-30 DIAGNOSIS — Z74.09 IMPAIRED FUNCTIONAL MOBILITY AND ACTIVITY TOLERANCE: ICD-10-CM

## 2024-05-30 DIAGNOSIS — M54.50 ACUTE BILATERAL LOW BACK PAIN WITHOUT SCIATICA: Primary | ICD-10-CM

## 2024-05-30 PROCEDURE — 97530 THERAPEUTIC ACTIVITIES: CPT | Mod: PN

## 2024-05-30 PROCEDURE — 97110 THERAPEUTIC EXERCISES: CPT | Mod: PN

## 2024-05-30 PROCEDURE — 97112 NEUROMUSCULAR REEDUCATION: CPT | Mod: PN

## 2024-06-03 ENCOUNTER — OCCUPATIONAL HEALTH (OUTPATIENT)
Dept: URGENT CARE | Facility: CLINIC | Age: 39
End: 2024-06-03

## 2024-06-03 PROCEDURE — 80305 DRUG TEST PRSMV DIR OPT OBS: CPT | Mod: S$GLB,,,

## 2024-06-07 ENCOUNTER — OFFICE VISIT (OUTPATIENT)
Dept: SPINE | Facility: CLINIC | Age: 39
End: 2024-06-07
Payer: COMMERCIAL

## 2024-06-07 VITALS — HEIGHT: 70 IN | BODY MASS INDEX: 28.77 KG/M2 | WEIGHT: 201 LBS

## 2024-06-07 DIAGNOSIS — M54.2 CERVICALGIA: ICD-10-CM

## 2024-06-07 DIAGNOSIS — M54.9 DORSALGIA, UNSPECIFIED: ICD-10-CM

## 2024-06-07 DIAGNOSIS — M54.41 BILATERAL LOW BACK PAIN WITH RIGHT-SIDED SCIATICA, UNSPECIFIED CHRONICITY: Primary | ICD-10-CM

## 2024-06-07 PROCEDURE — 99999 PR PBB SHADOW E&M-EST. PATIENT-LVL III: CPT | Mod: PBBFAC,,, | Performed by: PHYSICAL MEDICINE & REHABILITATION

## 2024-06-07 PROCEDURE — 99204 OFFICE O/P NEW MOD 45 MIN: CPT | Mod: S$GLB,,, | Performed by: PHYSICAL MEDICINE & REHABILITATION

## 2024-06-07 NOTE — PROGRESS NOTES
"  SUBJECTIVE:    Patient ID: Aashish James is a 38 y.o. male.    Chief Complaint: Mid-back Pain, Low-back Pain, and Neck Pain    This is a 38-year-old man who has no primary care physician.  Denies any chronic major medical problems.  No cancer history.  Does have a history of back pain and has treated with chiropractor and physical therapy in the past.  Drives a truck for living.  He slipped and fell off his truck in January of this year and has been having problems with his back since then.  He went to urgent care 03/12/2024 with complaints of right-sided low back pain radiating into the right leg.  Was given Lidoderm Robaxin naproxen and started in physical therapy which he has been attending.  I reviewed x-rays of the lumbar spine done 03/12/2024 which were negative for acute findings.  Has  mild T11 wedging.  He believes that was present on previous x-rays.  He presents to me now with a primary complaint of ongoing low back pain at the lumbosacral junction with numbness radiating down the right leg to the lateral calf.  Secondary complaint of posterior neck discomfort radiating into the upper back.  No bowel or bladder dysfunction fever chills sweats or unexpected weight loss pain level currently is 4/10 but at times as high as 6/10 interferes with quality of life in terms of activities of daily living recreation social activities.        History reviewed. No pertinent past medical history.  Social History     Socioeconomic History    Marital status:    Tobacco Use    Smoking status: Never    Smokeless tobacco: Never     History reviewed. No pertinent surgical history.  No family history on file.  Vitals:    06/07/24 1531   Weight: 91.2 kg (201 lb)   Height: 5' 10" (1.778 m)       Review of Systems   Constitutional:  Negative for chills, diaphoresis, fatigue, fever and unexpected weight change.   HENT:  Negative for trouble swallowing.    Eyes:  Negative for visual disturbance.   Respiratory:  " Negative for shortness of breath.    Cardiovascular:  Negative for chest pain.   Gastrointestinal:  Negative for abdominal pain, constipation, diarrhea, nausea and vomiting.   Genitourinary:  Negative for difficulty urinating.   Musculoskeletal:  Negative for arthralgias, back pain, gait problem, joint swelling, myalgias, neck pain and neck stiffness.   Neurological:  Negative for dizziness, speech difficulty, weakness, light-headedness, numbness and headaches.          Objective:      Physical Exam  Neurological:      Mental Status: He is alert and oriented to person, place, and time.      Comments: He is awake and in no acute distress  Mild tenderness to palpation posterior cervical and lumbar paraspinous musculature with no palpable masses  Cervical range of motion is normal and painless  Forward flexion of the lumbar spine is to about 60° before complaint pain at the lumbosacral junction.  Extension at 10° causes mild pain at the lumbosacral junction  He can heel and toe walk normally  Reflexes- +1-+2 reflexes at the following:   C5-Biceps   C6-Brachioradialis   C7-Triceps   L3/4-Patellar   S1-Achilles   Tom sign is negative bilaterally  Strength testing- 5/5 strength in the following muscle groups:  C5-Elbow flexion  C6-Wrist extension  C7-Elbow extension  C8-Finger flexion  T1-Finger abduction  L2-Hip flexion  L3-Knee extension  L4-Ankle dorsiflexion  L5-Great toe extension  S1-Ankle plantar flexion                  Assessment:       1. Bilateral low back pain with right-sided sciatica, unspecified chronicity    2. Cervicalgia    3. Dorsalgia, unspecified           Plan:     He has a nonfocal neurological examination and no historical red flags.  He has improved but is still not satisfied with quality of life with physical therapy.  At this point I recommend MRI of the cervical and lumbar spine.  He may be a candidate for epidural steroid injections or radiofrequency ablation.  He can follow up with me  after the scan      Bilateral low back pain with right-sided sciatica, unspecified chronicity    Cervicalgia  -     MRI Cervical Spine Without Contrast; Future; Expected date: 06/07/2024    Dorsalgia, unspecified  -     MRI Lumbar Spine Without Contrast; Future; Expected date: 06/07/2024

## 2024-06-11 ENCOUNTER — CLINICAL SUPPORT (OUTPATIENT)
Dept: REHABILITATION | Facility: HOSPITAL | Age: 39
End: 2024-06-11
Payer: COMMERCIAL

## 2024-06-11 DIAGNOSIS — M54.50 ACUTE BILATERAL LOW BACK PAIN WITHOUT SCIATICA: Primary | ICD-10-CM

## 2024-06-11 DIAGNOSIS — M79.604 RIGHT LEG PAIN: ICD-10-CM

## 2024-06-11 DIAGNOSIS — Z74.09 IMPAIRED FUNCTIONAL MOBILITY AND ACTIVITY TOLERANCE: ICD-10-CM

## 2024-06-11 PROCEDURE — 97110 THERAPEUTIC EXERCISES: CPT | Mod: PN

## 2024-06-11 PROCEDURE — 97112 NEUROMUSCULAR REEDUCATION: CPT | Mod: PN

## 2024-06-11 PROCEDURE — 97530 THERAPEUTIC ACTIVITIES: CPT | Mod: PN

## 2024-06-11 NOTE — PROGRESS NOTES
[facility]  Outpatient Physical Therapy Daily Treatment Note      Name: Aashish James  Clinic Number: [id number]  Visit Date: 6/11/2024    Therapy Diagnosis:   Encounter Diagnoses   Name Primary?    Acute bilateral low back pain without sciatica Yes    Right leg pain     Impaired functional mobility and activity tolerance        Physician: Gerhard Moy, *  Physician Orders: PT Eval and Treat  Medical Diagnosis from Referral:   S39.012A (ICD-10-CM) - Lumbosacral strain, initial encounter   M79.651 (ICD-10-CM) - Right thigh pain   M79.661 (ICD-10-CM) - Pain in right lower leg   S70.11XA (ICD-10-CM) - Contusion of right thigh, initial encounter      Evaluation Date: 3/20/2024  Authorization Period Expiration: 12/31/24  Plan of Care Expiration: 6/1/24                      Progress Update: 4/22/24                   Visit # / Visits authorized: 11/ 12                    FOTO: Visit #1 - 2/3     FOTO Due Visit 5 - 4/23/2024  FOTO Due Visit 10 - Follow up    Time In: 400p   Time Out: 459p  Total Billable Timed: 59 minutes  Total Billable Un-timed: 0 minutes    Precautions: Standard    Subjective     Pt's reports he is still having back and leg pain     Response to previous treatment: good   Functional change: none     Pain: 6/10  Location: Low Back     Objective     Aashish received therapeutic exercises to develop strength, endurance, ROM, flexibility, posture, and core stabilization for 10 minutes including:     Bike x 10 minutes, level 3    Nustep x 10 minutesNT     Aashish participated in neuromuscular re-education activities to improve: Balance, Coordination, Kinesthetic, Sense, Proprioception, and Posture for 34 minutes:    Lower trunk rotation x 20 Bilateral  Posterior pelvic tilt x 30   Hip ADD Ball x 30     Bridges Green Theraband x 30   Sidelying clams Green Theraband x 20  Bilateral     Hip 3 way x 10 Bilateral   Gastroc stretch 3 x 30 sec -  slant board     Palloff press 10#  x 20 Bilateral  Cable Column Rows 10#  x 20   Cable Column Extensions 10#  x 20   Stand hip extension with Wedge x 20 bilateral      Shuttle Bilateral 62# 2 x 15       Shuttle single leg 37# x 20 each   + Shuttle Kick Backs 12#     THERAPEUTIC ACTIVITIES x 15 minutes to improve functional activities:    Precor lumbar extension 50# x 30    Precor Torso 25# x 20 each   Precor hip adduction 65# x 30    Precor hip abduction 65# x 30    + Sled Push     Patient Education and HEP     He was compliant with home exercise program.    Education provided:   - Continue with Home Exercise Program, stay as active as possible     Written Home Exercises Provided: Patient instructed to cont prior HEP.  Exercises were reviewed and Aashish was able to demonstrate them prior to the end of the session.  Aashish demonstrated fair  understanding of the education provided.     See EMR under Patient Instructions for exercises provided prior visit.    Assessment     Aashish is moving better than last visit.  Still weak and requiring breaks to finish program.    Pt will continue to benefit from skilled outpatient physical therapy to address the deficits listed in the problem list box on initial evaluation, provide pt/family education and to maximize pt's level of independence in the home and community environment.     Aashish Is progressing well towards his goals.   Pt prognosis is Good.     Pt's spiritual, cultural and educational needs considered and pt agreeable to plan of care and goals.    Anticipated barriers to physical therapy: none    Goals:   SHORT TERM GOALS:  3 weeks  Progress  6/11/2024 Date met   Recent signs and systems trend is improving in order to progress towards LTG's. [] Met  [] Not Met  [x] Progressing     Patient will be independent with HEP in order to further progress and return to maximal function. [] Met  [] Not Met  [x] Progressing     Pain rating at Worst: 5/10 in order to progress towards increased independence  with activity. [] Met  [] Not Met  [x] Progressing     Patient will be able to correct postural deviations in sitting and standing, to decrease pain and promote postural awareness for injury prevention.  [] Met  [] Not Met  [x] Progressing        LONG TERM GOALS: 6 weeks  Progress  6/11/2024 Date met   Patient will return to normal ADL, recreational, and work related activities with less pain and limitation.  [] Met  [] Not Met  [x] Progressing     Patient will improve AROM to stated goals in order to return to maximal functional potential.  [] Met  [] Not Met  [x] Progressing     Patient will improve Strength to stated goals of appropriate musculature in order to improve functional independence.  [] Met  [] Not Met  [x] Progressing     Pain Rating at Best: 1/10 to improve Quality of Life.  [] Met  [] Not Met  [x] Progressing     Patient will meet predicted functional outcome (FOTO) score: 39% to increase self-worth & perceived functional ability. [] Met  [] Not Met  [x] Progressing     Patient will have met/partially met personal goal of: Lower back and leg pain and able to return to work  [] Met  [] Not Met  [x] Progressing        Plan     Continue with the plan of care established per initial evaluation    [employer], PT

## 2024-06-13 ENCOUNTER — TELEPHONE (OUTPATIENT)
Dept: PAIN MEDICINE | Facility: CLINIC | Age: 39
End: 2024-06-13
Payer: COMMERCIAL

## 2024-06-13 ENCOUNTER — PATIENT MESSAGE (OUTPATIENT)
Dept: SPINE | Facility: CLINIC | Age: 39
End: 2024-06-13
Payer: COMMERCIAL

## 2024-06-13 NOTE — TELEPHONE ENCOUNTER
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

## 2024-06-13 NOTE — TELEPHONE ENCOUNTER
----- Message from Javon Perez MD sent at 6/13/2024  7:33 AM CDT -----  I will see him as long as he has approval from worker's comp to see me

## 2024-06-21 ENCOUNTER — HOSPITAL ENCOUNTER (OUTPATIENT)
Dept: RADIOLOGY | Facility: HOSPITAL | Age: 39
Discharge: HOME | End: 2024-06-21
Attending: PHYSICAL MEDICINE & REHABILITATION
Payer: COMMERCIAL

## 2024-06-21 DIAGNOSIS — M54.9 DORSALGIA, UNSPECIFIED: ICD-10-CM

## 2024-06-21 DIAGNOSIS — M54.2 CERVICALGIA: ICD-10-CM

## 2024-06-21 PROCEDURE — 72141 MRI NECK SPINE W/O DYE: CPT | Mod: 26,,, | Performed by: RADIOLOGY

## 2024-06-21 PROCEDURE — 72141 MRI NECK SPINE W/O DYE: CPT | Mod: TC

## 2024-06-21 PROCEDURE — 72148 MRI LUMBAR SPINE W/O DYE: CPT | Mod: TC

## 2024-06-21 PROCEDURE — 72148 MRI LUMBAR SPINE W/O DYE: CPT | Mod: 26,,, | Performed by: RADIOLOGY

## 2024-06-24 ENCOUNTER — TELEPHONE (OUTPATIENT)
Dept: PAIN MEDICINE | Facility: CLINIC | Age: 39
End: 2024-06-24
Payer: COMMERCIAL

## 2024-06-24 ENCOUNTER — OFFICE VISIT (OUTPATIENT)
Dept: SPINE | Facility: CLINIC | Age: 39
End: 2024-06-24
Payer: COMMERCIAL

## 2024-06-24 VITALS — BODY MASS INDEX: 28.78 KG/M2 | WEIGHT: 201.06 LBS | HEIGHT: 70 IN

## 2024-06-24 DIAGNOSIS — M51.36 LUMBAR DEGENERATIVE DISC DISEASE: ICD-10-CM

## 2024-06-24 DIAGNOSIS — M54.41 BILATERAL LOW BACK PAIN WITH RIGHT-SIDED SCIATICA, UNSPECIFIED CHRONICITY: Primary | ICD-10-CM

## 2024-06-24 DIAGNOSIS — M54.2 CERVICALGIA: ICD-10-CM

## 2024-06-24 DIAGNOSIS — M54.16 LUMBAR RADICULOPATHY: Primary | ICD-10-CM

## 2024-06-24 DIAGNOSIS — R59.0 LYMPHADENOPATHY, ABDOMINAL: ICD-10-CM

## 2024-06-24 PROCEDURE — 99999 PR PBB SHADOW E&M-EST. PATIENT-LVL III: CPT | Mod: PBBFAC,,, | Performed by: PHYSICAL MEDICINE & REHABILITATION

## 2024-06-24 PROCEDURE — 99213 OFFICE O/P EST LOW 20 MIN: CPT | Mod: S$GLB,,, | Performed by: PHYSICAL MEDICINE & REHABILITATION

## 2024-06-24 NOTE — TELEPHONE ENCOUNTER
----- Message from Javon Perez MD sent at 6/24/2024  3:35 PM CDT -----  Please schedule for interlaminar injection L5-S1

## 2024-06-24 NOTE — H&P (VIEW-ONLY)
SUBJECTIVE:    Patient ID: Aashish James is a 39 y.o. male.    Chief Complaint: Follow-up    He is here to review cervical and lumbar MRIs done 06/21/2024 to evaluate his complaints of neck pain radiating into the upper back and low back pain radiating into the right calf following a fall at work.      The cervical MRI is summarized below:      FINDINGS:  Vertebral column: There is no recent study for comparison.  The vertebral bodies maintain normal height.  There is no fracture.  Alignment is grossly normal.  There is at least moderate disc space narrowing at the C5-6 level where there is mild endplate osteophyte formation.  There is mild disc space narrowing at the C4-5 level.  Baseline marrow signal is normal.  The odontoid process is intact.     Spinal canal, cord, epidural space: The spinal canal may be borderline small on a developmental basis.  The cord is normal in caliber, contour and signal intensity.  There is no abnormal epidural mass or fluid collection.     Findings by level:     C2-3: There is no spinal canal or significant foraminal stenosis.  There is only mild facet joint arthropathy.     C3-4: There is at least moderate left foraminal stenosis due to uncovertebral spurring.  There is no spinal canal or significant right foraminal stenosis.     C4-5: There is mild disc space narrowing.  There is bilateral uncovertebral spurring with a minimal disc osteophyte complex.  There is no significant spinal stenosis.  There is at least mild bilateral foraminal stenosis.     C5-6: There is disc space narrowing, left greater than right uncovertebral spurring with a broad disc osteophyte complex eccentric to the left and only mild facet joint arthropathy.  There is narrowing of the subarachnoid space.  There is mild spinal stenosis without acute cord compression but there is severe left lateral recess and foraminal stenosis.  There is a 4 mm left foraminal perineural cyst.     C6-7: There is a mild disc  osteophyte complex.  There is mild bilateral uncovertebral spurring.  There is no significant spinal stenosis.  There is only mild bilateral foraminal stenosis.     C7-T1: There is mild bilateral uncovertebral spurring with a minimal disc osteophyte complex.  There is no spinal stenosis.  There is mild left foraminal stenosis.     Soft tissues, other: The prevertebral soft tissues are normal.  The airway is patent.  The thyroid gland is homogeneous in signal.  There are shotty lymph nodes in the neck which are nonspecific.     Impression:     1. There is degenerative change discussed above.  There is disc space narrowing most apparent at the C5-6 level.  There is some degree of disc osteophyte complex, uncovertebral spurring and mild facet joint arthropathy at several levels.  There is no significant spinal stenosis.  There is no cord compression.  There is, however, moderate left foraminal stenosis at the C3-4 level.  There is mild bilateral foraminal stenosis at the C4-5 level.  2. At the C5-6 level there is mild spinal stenosis, severe left lateral recess and foraminal stenosis.  3. At the C6-7 level there is mild bilateral foraminal stenosis.  4. There is also mild left foraminal stenosis at the C7-T1 level.        The lumbar MRI is summarized below:      FINDINGS:  Vertebral column: The lumbar vertebral bodies maintain normal height.  There is no acute fracture.  Alignment is normal.  There is a remote Schmorl's node in the superior endplate of T11 and the adjacent inferior endplate of T10.  This accounts for the appearance of mild superior endplate flattening on plain films.  There is no acute fracture.  Also there is no significant lumbar disc space narrowing.  Baseline marrow signal is normal.  Disc signal is normal.     Spinal canal, conus, epidural space: The spinal canal appears somewhat small on a developmental basis.  The conus terminates at the level of L1-2 and is normal in contour and signal  intensity.  There is no abnormal epidural mass or fluid collection.     Findings by level:     On the sagittal images, there is no spinal stenosis or cord compression at the T10-11 or T11-12 levels.     T12-L1: Unremarkable     L1-2: There is no spinal canal or significant foraminal stenosis.     L2-3: There is very mild facet joint arthropathy.  There is no spinal canal or significant foraminal stenosis.     L3-4: There is mild facet joint arthropathy.  There is only mild bilateral foraminal stenosis.     L4-5: There is a diffuse disc bulge with mild osteophytic ridging eccentric to the right where there is a subtle right lateral annular fissure.  There is mild facet joint arthropathy.  There is flattening of the ventral dural sac.  There is borderline spinal stenosis with mild-to-moderate bilateral foraminal stenosis.     L5-S1: There is a mild diffuse disc bulge and mild facet joint arthropathy.  There is no spinal canal or significant foraminal stenosis.     Soft tissues, other: The prevertebral soft tissues appear normal.  The aorta is normal in caliber.  There are para-aortic lymph nodes with a prominent left periaortic node measuring 1.7 x 1.2 cm.     Impression:     1. There is relatively mild degenerative change in the lumbar spine.  There is no fracture or malalignment but there is some degree of disc bulge and facet joint arthropathy.  These changes result in mild bilateral foraminal stenosis at the L3-4 level.  2. At the L4-5 level there is borderline spinal stenosis with mild-to-moderate bilateral foraminal stenosis.  3. There is mild degenerative change at the L5-S1 level but there is no spinal canal or foraminal stenosis.  4. Incidentally noted is prominent Yamila aortic lymphadenopathy.  This is nonspecific and incompletely evaluated.  Clinical correlation is needed.  Further evaluation with dedicated CT abdomen and pelvis may be warranted.  This report was flagged in Epic as containing an incidental  "finding.      Clinically he is a bit better.  He is still participating in physical therapy.  Complaining of posterior neck pain at the cervicothoracic junction that sometimes radiates as far down as the scapulae.  Ongoing complaints of low back pain at the lumbosacral junction intermittently radiating into the right leg.  Current pain level is 2/10 but at times as high as 4/10 and interferes with his quality of life in terms of activities of daily living recreation and social activities                  History reviewed. No pertinent past medical history.  Social History     Socioeconomic History    Marital status:    Tobacco Use    Smoking status: Never    Smokeless tobacco: Never     History reviewed. No pertinent surgical history.  No family history on file.  Vitals:    06/24/24 1519   Weight: 91.2 kg (201 lb 1 oz)   Height: 5' 10" (1.778 m)       Review of Systems   Constitutional:  Negative for chills, diaphoresis, fatigue, fever and unexpected weight change.   HENT:  Negative for trouble swallowing.    Eyes:  Negative for visual disturbance.   Respiratory:  Negative for shortness of breath.    Cardiovascular:  Negative for chest pain.   Gastrointestinal:  Negative for abdominal pain, constipation, diarrhea, nausea and vomiting.   Genitourinary:  Negative for difficulty urinating.   Musculoskeletal:  Negative for arthralgias, back pain, gait problem, joint swelling, myalgias, neck pain and neck stiffness.   Neurological:  Negative for dizziness, speech difficulty, weakness, light-headedness, numbness and headaches.          Objective:      Physical Exam  Neurological:      Mental Status: He is alert and oriented to person, place, and time.             Assessment:       1. Bilateral low back pain with right-sided sciatica, unspecified chronicity    2. Cervicalgia    3. Lymphadenopathy, abdominal    4. Lumbar degenerative disc disease           Plan:     The most concerning issue on his imaging is the " unexpected periaortic lymphadenopathy.  This needs further evaluation with CT of the abdomen and pelvis with IV contrast.  For symptom relief I am recommending interlaminar injections at L5-S1.  Consider medial branch blocks and subsequent radiofrequency ablation bilaterally C5-6 and C6-7.  Follow up with me after the procedure.  We can report the CT findings over the phone and make referrals as indicated.      Bilateral low back pain with right-sided sciatica, unspecified chronicity    Cervicalgia    Lymphadenopathy, abdominal  -     CT Abdomen Pelvis With IV Contrast Routine Oral Contrast; Future; Expected date: 06/24/2024    Lumbar degenerative disc disease  -     Back/Cervical Brace For Home Use

## 2024-07-03 NOTE — DISCHARGE INSTRUCTIONS
Before leaving, please make sure you have all your personal belongings such as glasses, purses, wallets, keys, cell phones, jewelry, jackets etc   Pain injection instructions:     This procedure may take a couple weeks to relieve pain  You may get some pain relief from the local anesthetic initally.   Steroids can have side effects of flushed face or nervous feeling.    No driving for 24 hrs.   Activity as tolerated- gradually increase activities.  Dont lift over 10 lbs for 24 hrs   No heat at injection sites for 2 full days. No heating pads, hot tubs, saunas, or swimming in any body of water or pool for 2 full days.  Use ice pack for mild swelling and for comfort , apply for 20 minutes, remove for 20 minute intervals. No direct contact of ice itself  to skin.  May shower today if not drowsy.  Do not allow shower water to beat on injections site(s) for 2 full days. No tub baths for two full days.      Resume Aspirin, Plavix, or Coumadin the day after the procedure unless otherwise instructed.   If diabetic,monitor your glucose carefully as steroids can increase your glucose level    Seek immediate medical help for:     Severe increase in pain not relieved by medication or ice or any new pain in the region .    Prolonged (more than 24 hrs)or increasing weakness or numbness in the legs or arms. - it is normal to have weakness/numbness for up to 8 hrs.   Fever above 100 degrees F , Drainage,redness,active bleeding, or increased swelling at the injection site.  Headache that increases when sitting up, but decreases when lying down.  New shortness of breath, chest pain, or breathing problems.    After Surgery:  Always be aware that any surgery can cause these symptoms:    Pain- Medication can be prescribed for pain to decrease your pain but may not completely take your pain away. Over the Counter pain medicine my be enough and you can always use Ice and rest to help ease pain.    Bleeding- a little bleeding after a  surgery is usually within normal.  If there is a lot of blood you need to notify your MD.  Emergency treatments of bleeding are cold application, elevation of the bleeding site and compression.    Infection- Infection after surgery is NOT a normal occurrence.  Signs of infection are fever, swelling, hot to touch the incision.  If this occurs notify your MD immediately.    Nausea- this can be common after a surgery especially if you have had anesthesia medicine or are taking pain medicine.  Steroids have a side effect of nausea sometimes. Staying on clear liquids, bland foods, gingerale, or over the counter anti nausea medicines can help.  If you vomit more than once, notify your MD.  Anti Nausea medicines can be prescribed.

## 2024-07-16 ENCOUNTER — HOSPITAL ENCOUNTER (OUTPATIENT)
Facility: HOSPITAL | Age: 39
Discharge: HOME | End: 2024-07-16
Attending: ANESTHESIOLOGY | Admitting: ANESTHESIOLOGY
Payer: COMMERCIAL

## 2024-07-16 DIAGNOSIS — M54.16 LUMBAR RADICULITIS: ICD-10-CM

## 2024-07-16 PROCEDURE — A4216 STERILE WATER/SALINE, 10 ML: HCPCS | Performed by: ANESTHESIOLOGY

## 2024-07-16 PROCEDURE — 25500020 PHARM REV CODE 255: Performed by: ANESTHESIOLOGY

## 2024-07-16 PROCEDURE — 63600175 PHARM REV CODE 636 W HCPCS: Performed by: ANESTHESIOLOGY

## 2024-07-16 PROCEDURE — 62323 NJX INTERLAMINAR LMBR/SAC: CPT | Mod: ,,, | Performed by: ANESTHESIOLOGY

## 2024-07-16 PROCEDURE — 62323 NJX INTERLAMINAR LMBR/SAC: CPT | Performed by: ANESTHESIOLOGY

## 2024-07-16 PROCEDURE — 25000003 PHARM REV CODE 250: Performed by: ANESTHESIOLOGY

## 2024-07-16 RX ORDER — DEXAMETHASONE SODIUM PHOSPHATE 10 MG/ML
INJECTION INTRAMUSCULAR; INTRAVENOUS
Status: DISCONTINUED | OUTPATIENT
Start: 2024-07-16 | End: 2024-07-16 | Stop reason: HOSPADM

## 2024-07-16 RX ORDER — ALPRAZOLAM 1 MG/1
1 TABLET, ORALLY DISINTEGRATING ORAL
Status: COMPLETED | OUTPATIENT
Start: 2024-07-16 | End: 2024-07-16

## 2024-07-16 RX ORDER — LIDOCAINE HYDROCHLORIDE 10 MG/ML
INJECTION, SOLUTION EPIDURAL; INFILTRATION; INTRACAUDAL; PERINEURAL
Status: DISCONTINUED | OUTPATIENT
Start: 2024-07-16 | End: 2024-07-16 | Stop reason: HOSPADM

## 2024-07-16 RX ORDER — SODIUM CHLORIDE, SODIUM LACTATE, POTASSIUM CHLORIDE, CALCIUM CHLORIDE 600; 310; 30; 20 MG/100ML; MG/100ML; MG/100ML; MG/100ML
INJECTION, SOLUTION INTRAVENOUS CONTINUOUS
Status: DISCONTINUED | OUTPATIENT
Start: 2024-07-16 | End: 2024-07-16 | Stop reason: HOSPADM

## 2024-07-16 RX ORDER — LIDOCAINE HYDROCHLORIDE 10 MG/ML
1 INJECTION, SOLUTION EPIDURAL; INFILTRATION; INTRACAUDAL; PERINEURAL ONCE
Status: DISCONTINUED | OUTPATIENT
Start: 2024-07-16 | End: 2024-07-16 | Stop reason: HOSPADM

## 2024-07-16 RX ORDER — SODIUM CHLORIDE 9 MG/ML
INJECTION, SOLUTION INTRAMUSCULAR; INTRAVENOUS; SUBCUTANEOUS
Status: DISCONTINUED | OUTPATIENT
Start: 2024-07-16 | End: 2024-07-16 | Stop reason: HOSPADM

## 2024-07-16 RX ADMIN — ALPRAZOLAM 1 MG: 1 TABLET, ORALLY DISINTEGRATING ORAL at 12:07

## 2024-07-16 NOTE — OP NOTE
PROCEDURE DATE: 7/16/2024    Procedure:   Interlaminar epidural steroid injection at L5-S1 under fluoroscopic guidance.    Diagnosis: lUMBAR radiculitis  pOSTOP DIAGNOSIS: sAME    Physician: Andrew Diana M.D.    Medications injected:10 mg dexamethasone with 4 ml of preservative free NaCl    Local anesthetic injected:    Lidocaine 1% 2 ml total    Sedation Medications: none    Estimated blood loss:  None    Complications:  None    Technique:  Time-out taken to identify patient and procedure prior to starting the procedure.  With the patient laying in a prone position, the area was prepped and draped in the usual sterile fashion using ChloraPrep and a fenestrated drape.  After determining the target level with an AP fluoroscopic view, local anesthetic was given using a 25-gauge 1.5 inch needle by raising a wheal and then infiltrating toward the interlaminar entry space.  A 3.5inch 20-gauge Touhy needle was introduced under AP fluoroscopic guidance to the interlaminar space of L5-S1. Once the trajectory was established, the needle was visualized in the lateral view and advanced using loss of resistance technique. Once in the desired position, 1ml contrast was injected to confirm placement and there was no vascular uptake nor intrathecal spread.  The medication was then injected slowly. The patient tolerated the procedure well.      The patient was monitored after the procedure.   They were given post-procedure and discharge instructions to follow at home.  The patient was discharged in a stable condition.

## 2024-07-16 NOTE — PLAN OF CARE
Patient is awake and alert and says he is ready to go home; his spouse says she is ready to take patient home and she is driving. Patient's vital signs and low back injection site stable. Patient denies pain, nausea weakness or dizziness though says he is tired.All patient belongings have been returned to patient; he is holding his phone. Patient is being discharged via wheelchair to car his spouse is driving.

## 2024-07-16 NOTE — DISCHARGE SUMMARY
[facility] - Periop Services  Discharge Note  Short Stay    Procedure(s) (LRB):  Injection-steroid-epidural-lumbar l5-s1 (N/A)      OUTCOME: Patient tolerated treatment/procedure well without complication and is now ready for discharge.    DISPOSITION: Home or Self Care    FINAL DIAGNOSIS:  <principal problem not specified>    FOLLOWUP: In clinic    DISCHARGE INSTRUCTIONS:    Discharge Procedure Orders   Notify your health care provider if you experience any of the following:  temperature >100.4     Notify your health care provider if you experience any of the following:  severe uncontrolled pain     Notify your health care provider if you experience any of the following:  redness, tenderness, or signs of infection (pain, swelling, redness, odor or green/yellow discharge around incision site)     Activity as tolerated        TIME SPENT ON DISCHARGE: 30 minutes

## 2024-07-17 ENCOUNTER — PATIENT MESSAGE (OUTPATIENT)
Dept: SPINE | Facility: CLINIC | Age: 39
End: 2024-07-17
Payer: COMMERCIAL

## 2024-07-17 VITALS
HEIGHT: 70 IN | WEIGHT: 201.06 LBS | DIASTOLIC BLOOD PRESSURE: 78 MMHG | BODY MASS INDEX: 28.78 KG/M2 | OXYGEN SATURATION: 97 % | HEART RATE: 67 BPM | SYSTOLIC BLOOD PRESSURE: 127 MMHG | RESPIRATION RATE: 18 BRPM | TEMPERATURE: 97 F

## 2024-07-17 DIAGNOSIS — M54.2 CERVICALGIA: ICD-10-CM

## 2024-07-17 DIAGNOSIS — M54.41 BILATERAL LOW BACK PAIN WITH RIGHT-SIDED SCIATICA, UNSPECIFIED CHRONICITY: Primary | ICD-10-CM

## 2024-07-17 RX ORDER — DICLOFENAC SODIUM 75 MG/1
75 TABLET, DELAYED RELEASE ORAL 2 TIMES DAILY PRN
Qty: 40 TABLET | Refills: 0 | Status: SHIPPED | OUTPATIENT
Start: 2024-07-17

## 2024-07-17 RX ORDER — TRAMADOL HYDROCHLORIDE 50 MG/1
50 TABLET ORAL EVERY 6 HOURS PRN
Qty: 28 TABLET | Refills: 0 | Status: SHIPPED | OUTPATIENT
Start: 2024-07-17

## 2024-07-17 NOTE — TELEPHONE ENCOUNTER
I changed the anti-inflammatory medication to diclofenac which is not necessarily stronger but in a different class than naproxen.  I added tramadol which is a mild opioid.  I advise against long-term use of tramadol or any other controlled substance for back and neck pain

## 2024-08-27 ENCOUNTER — OFFICE VISIT (OUTPATIENT)
Dept: SPINE | Facility: CLINIC | Age: 39
End: 2024-08-27
Payer: COMMERCIAL

## 2024-08-27 VITALS — WEIGHT: 201.06 LBS | BODY MASS INDEX: 28.78 KG/M2 | HEIGHT: 70 IN

## 2024-08-27 DIAGNOSIS — M54.2 CERVICALGIA: ICD-10-CM

## 2024-08-27 DIAGNOSIS — M54.41 BILATERAL LOW BACK PAIN WITH RIGHT-SIDED SCIATICA, UNSPECIFIED CHRONICITY: Primary | ICD-10-CM

## 2024-08-27 PROCEDURE — 99213 OFFICE O/P EST LOW 20 MIN: CPT | Mod: S$GLB,,, | Performed by: PHYSICAL MEDICINE & REHABILITATION

## 2024-08-27 NOTE — PROGRESS NOTES
"  SUBJECTIVE:    Patient ID: Aashish James is a 39 y.o. male.    Chief Complaint: Back Pain    He is here for follow-up status post interlaminar injection L5-S1 on 07/16/2024 with Dr. Diana.  He describes about 70 % improvement in his back and right leg symptoms with improved functional mobility however the relief only lasted for a few weeks.  He does say that he is no longer bothered by radiating pain down the right leg into the calf but his back is the same.  He is also complaining of circumferential numbness in both legs that happens when he sits for too long.  Says his neck has been doing okay.  He is back out of work again.  He is interested in therapy.  Pain level is 8/10          History reviewed. No pertinent past medical history.  Social History     Socioeconomic History    Marital status:    Tobacco Use    Smoking status: Never    Smokeless tobacco: Never     Past Surgical History:   Procedure Laterality Date    EPIDURAL STEROID INJECTION INTO LUMBAR SPINE N/A 7/16/2024    Procedure: Injection-steroid-epidural-lumbar;  Surgeon: Andrew Diana MD;  Location: [facility];  Service: Pain Management;  Laterality: N/A;     No family history on file.  Vitals:    08/27/24 1541   Weight: 91.2 kg (201 lb 1 oz)   Height: 5' 10" (1.778 m)       Review of Systems   Constitutional:  Negative for chills, diaphoresis, fatigue, fever and unexpected weight change.   HENT:  Negative for trouble swallowing.    Eyes:  Negative for visual disturbance.   Respiratory:  Negative for shortness of breath.    Cardiovascular:  Negative for chest pain.   Gastrointestinal:  Negative for abdominal pain, constipation, diarrhea, nausea and vomiting.   Genitourinary:  Negative for difficulty urinating.   Musculoskeletal:  Negative for arthralgias, back pain, gait problem, joint swelling, myalgias, neck pain and neck stiffness.   Neurological:  Negative for dizziness, speech difficulty, weakness, light-headedness, numbness and " headaches.          Objective:      Physical Exam  Neurological:      Mental Status: He is alert and oriented to person, place, and time.             Assessment:       1. Bilateral low back pain with right-sided sciatica, unspecified chronicity    2. Cervicalgia           Plan:     No significant improvement status post interlaminar injection L5-S1.  Per his request we will get him set up with additional physical therapy.  He can follow up with me at the completion of therapy.  At the numbness and tingling persists in his legs he may be a candidate for EMG and nerve conduction studies.      Bilateral low back pain with right-sided sciatica, unspecified chronicity  -     Ambulatory referral/consult to Physical/Occupational Therapy; Future; Expected date: 09/03/2024    Cervicalgia  -     Ambulatory referral/consult to Physical/Occupational Therapy; Future; Expected date: 09/03/2024

## 2024-08-28 DIAGNOSIS — M54.41 BILATERAL LOW BACK PAIN WITH RIGHT-SIDED SCIATICA, UNSPECIFIED CHRONICITY: ICD-10-CM

## 2024-08-28 DIAGNOSIS — M54.2 CERVICALGIA: ICD-10-CM

## 2024-08-28 RX ORDER — TRAMADOL HYDROCHLORIDE 50 MG/1
50 TABLET ORAL EVERY 6 HOURS PRN
Qty: 28 TABLET | Refills: 0 | Status: SHIPPED | OUTPATIENT
Start: 2024-08-28

## 2024-08-28 RX ORDER — DICLOFENAC SODIUM 75 MG/1
75 TABLET, DELAYED RELEASE ORAL 2 TIMES DAILY PRN
Qty: 40 TABLET | Refills: 0 | Status: SHIPPED | OUTPATIENT
Start: 2024-08-28

## 2024-08-30 ENCOUNTER — TELEPHONE (OUTPATIENT)
Dept: SPINE | Facility: CLINIC | Age: 39
End: 2024-08-30
Payer: COMMERCIAL

## 2024-09-06 ENCOUNTER — PATIENT MESSAGE (OUTPATIENT)
Dept: SPINE | Facility: CLINIC | Age: 39
End: 2024-09-06
Payer: COMMERCIAL

## 2024-09-26 NOTE — PROGRESS NOTES
[facility]  Outpatient Physical Therapy Daily Treatment Note      Name: Aashish James  Clinic Number: [id number]  Visit Date: 5/30/2024    Therapy Diagnosis:   Encounter Diagnoses   Name Primary?    Acute bilateral low back pain without sciatica Yes    Right leg pain     Impaired functional mobility and activity tolerance        Physician: Gerhard Moy, *  Physician Orders: PT Eval and Treat  Medical Diagnosis from Referral:   S39.012A (ICD-10-CM) - Lumbosacral strain, initial encounter   M79.651 (ICD-10-CM) - Right thigh pain   M79.661 (ICD-10-CM) - Pain in right lower leg   S70.11XA (ICD-10-CM) - Contusion of right thigh, initial encounter      Evaluation Date: 3/20/2024  Authorization Period Expiration: 12/31/24  Plan of Care Expiration: 6/1/24                      Progress Update: 4/22/24                   Visit # / Visits authorized: 10/ 12                    FOTO: Visit #1 - 2/3     FOTO Due Visit 5 - 4/23/2024  FOTO Due Visit 10 - Follow up    Time In: 400p   Time Out: 459p  Total Billable Timed: 59 minutes  Total Billable Un-timed: 0 minutes    Precautions: Standard    Subjective     Pt's reports he has some increased mid back and  low back discomfort, likely from exercising     Response to previous treatment: good   Functional change: none     Pain: 6/10  Location: Low Back     Objective     Aashish received therapeutic exercises to develop strength, endurance, ROM, flexibility, posture, and core stabilization for 10 minutes including:     Bike x 10 minutes, level 3    Nustep x 10 minutesNT     Aashish participated in neuromuscular re-education activities to improve: Balance, Coordination, Kinesthetic, Sense, Proprioception, and Posture for 34 minutes:    Lower trunk rotation x 20 Bilateral  Posterior pelvic tilt x 30   Hip ADD Ball x 30     Bridges Green Theraband x 30   Sidelying clams Green Theraband x 20  Bilateral     Hip 3 way x 10 Bilateral   Gastroc stretch 3 x  Pharmacist Renal Dose Adjustment Note    Marycruz Perez is a 89 y.o. female being treated with the medication ciprofloxacin    Patient Data:    Vital Signs (Most Recent):  Temp: 98.1 °F (36.7 °C) (09/26/24 1131)  Pulse: 75 (09/26/24 1221)  Resp: 20 (09/26/24 1131)  BP: 129/62 (09/26/24 1131)  SpO2: 97 % (09/26/24 1303) Vital Signs (72h Range):  Temp:  [98 °F (36.7 °C)-99.8 °F (37.7 °C)]   Pulse:  [73-94]   Resp:  [11-25]   BP: ()/(49-91)   SpO2:  [94 %-100 %]      Recent Labs   Lab 09/21/24  0254 09/22/24  0300   CREATININE 0.9 0.9     Serum creatinine: 0.9 mg/dL 09/22/24 0300  Estimated creatinine clearance: 37.9 mL/min    Medication:ciprofloxacin dose: 400mg frequency q24h will be changed to medication:ciprofloxacin dose:400mg frequency:q12h    Pharmacist's Name: Remi Loo  Pharmacist's Extension: 1068     30 sec -  slant board     Palloff press 10# x 20 Bilateral  + Cable Column Rows 10#   +Cable Column Extensions 10#   Stand hip extension with Wedge x 20 bilateral      Shuttle Bilateral 50# 2 x 15       Shuttle single leg 37# x 20 each   + Shuttle Kick Backs 12#     THERAPEUTIC ACTIVITIES x 15 minutes to improve functional activities:    Precor lumbar extension 50# x 30    Precor Torso 25# x 20 each   Precor hip adduction 65# x 30    Precor hip abduction 65# x 30    + Sled Push     Patient Education and HEP     He was compliant with home exercise program.    Education provided:   - Continue with Home Exercise Program, stay as active as possible     Written Home Exercises Provided: Patient instructed to cont prior HEP.  Exercises were reviewed and Aashish was able to demonstrate them prior to the end of the session.  Aashish demonstrated fair  understanding of the education provided.     See EMR under Patient Instructions for exercises provided prior visit.    Assessment     Aashish had a set back and is in more pain today, difficulty with exercising requiring more breaks   Pt will continue to benefit from skilled outpatient physical therapy to address the deficits listed in the problem list box on initial evaluation, provide pt/family education and to maximize pt's level of independence in the home and community environment.     Aashish Is progressing well towards his goals.   Pt prognosis is Good.     Pt's spiritual, cultural and educational needs considered and pt agreeable to plan of care and goals.    Anticipated barriers to physical therapy: none    Goals:   SHORT TERM GOALS:  3 weeks  Progress  5/30/2024 Date met   Recent signs and systems trend is improving in order to progress towards LTG's. [] Met  [] Not Met  [x] Progressing     Patient will be independent with HEP in order to further progress and return to maximal function. [] Met  [] Not Met  [x] Progressing     Pain rating at Worst: 5/10 in order to  progress towards increased independence with activity. [] Met  [] Not Met  [x] Progressing     Patient will be able to correct postural deviations in sitting and standing, to decrease pain and promote postural awareness for injury prevention.  [] Met  [] Not Met  [x] Progressing        LONG TERM GOALS: 6 weeks  Progress  5/30/2024 Date met   Patient will return to normal ADL, recreational, and work related activities with less pain and limitation.  [] Met  [] Not Met  [x] Progressing     Patient will improve AROM to stated goals in order to return to maximal functional potential.  [] Met  [] Not Met  [x] Progressing     Patient will improve Strength to stated goals of appropriate musculature in order to improve functional independence.  [] Met  [] Not Met  [x] Progressing     Pain Rating at Best: 1/10 to improve Quality of Life.  [] Met  [] Not Met  [x] Progressing     Patient will meet predicted functional outcome (FOTO) score: 39% to increase self-worth & perceived functional ability. [] Met  [] Not Met  [x] Progressing     Patient will have met/partially met personal goal of: Lower back and leg pain and able to return to work  [] Met  [] Not Met  [x] Progressing        Plan     Continue with the plan of care established per initial evaluation    Tyler Painting, PT

## 2024-09-30 ENCOUNTER — PATIENT MESSAGE (OUTPATIENT)
Dept: SPINE | Facility: CLINIC | Age: 39
End: 2024-09-30
Payer: COMMERCIAL

## 2024-12-19 DIAGNOSIS — M54.2 CERVICALGIA: Primary | ICD-10-CM

## 2024-12-19 DIAGNOSIS — M54.41 BILATERAL LOW BACK PAIN WITH RIGHT-SIDED SCIATICA, UNSPECIFIED CHRONICITY: ICD-10-CM

## 2025-01-07 ENCOUNTER — OFFICE VISIT (OUTPATIENT)
Dept: SPINE | Facility: CLINIC | Age: 40
End: 2025-01-07
Payer: COMMERCIAL

## 2025-01-07 ENCOUNTER — TELEPHONE (OUTPATIENT)
Dept: PAIN MEDICINE | Facility: CLINIC | Age: 40
End: 2025-01-07
Payer: COMMERCIAL

## 2025-01-07 VITALS — BODY MASS INDEX: 28.78 KG/M2 | HEIGHT: 70 IN | WEIGHT: 201.06 LBS

## 2025-01-07 DIAGNOSIS — R59.0 LYMPHADENOPATHY, ABDOMINAL: ICD-10-CM

## 2025-01-07 DIAGNOSIS — M54.16 LUMBAR RADICULOPATHY: Primary | ICD-10-CM

## 2025-01-07 DIAGNOSIS — G89.29 CHRONIC RIGHT-SIDED LOW BACK PAIN WITH RIGHT-SIDED SCIATICA: Primary | ICD-10-CM

## 2025-01-07 DIAGNOSIS — M54.41 CHRONIC RIGHT-SIDED LOW BACK PAIN WITH RIGHT-SIDED SCIATICA: Primary | ICD-10-CM

## 2025-01-07 PROCEDURE — 99999 PR PBB SHADOW E&M-EST. PATIENT-LVL III: CPT | Mod: PBBFAC,,, | Performed by: PHYSICAL MEDICINE & REHABILITATION

## 2025-01-07 PROCEDURE — 99213 OFFICE O/P EST LOW 20 MIN: CPT | Mod: S$GLB,,, | Performed by: PHYSICAL MEDICINE & REHABILITATION

## 2025-01-07 NOTE — PROGRESS NOTES
"  SUBJECTIVE:    Patient ID: Aashish James is a 39 y.o. male.    Chief Complaint: No chief complaint on file.    He presents today with ongoing complaints of right-sided low back pain radiating down the right leg into the calf.  These are familiar symptoms.  Historically he did not respond to any significant degree to interlaminar injections at L5-S1.  He is no longer working.  He is interested in additional physical therapy.  His pain level is 7/10 and interferes with his quality of life in terms of activities of daily living recreation and social activities.  He still has not gotten a CT of the abdomen and pelvis I recommended to follow up on some nonspecific abdominal lymphadenopathy seen incidentally on his lumbar MRI.  He says he forgot.          History reviewed. No pertinent past medical history.  Social History     Socioeconomic History    Marital status:    Tobacco Use    Smoking status: Never    Smokeless tobacco: Never     Past Surgical History:   Procedure Laterality Date    EPIDURAL STEROID INJECTION INTO LUMBAR SPINE N/A 7/16/2024    Procedure: Injection-steroid-epidural-lumbar;  Surgeon: Andrew Diana MD;  Location: [facility];  Service: Pain Management;  Laterality: N/A;     No family history on file.  Vitals:    01/07/25 1027   Weight: 91.2 kg (201 lb 1 oz)   Height: 5' 10" (1.778 m)       Review of Systems   Constitutional:  Negative for chills, diaphoresis, fatigue, fever and unexpected weight change.   HENT:  Negative for trouble swallowing.    Eyes:  Negative for visual disturbance.   Respiratory:  Negative for shortness of breath.    Cardiovascular:  Negative for chest pain.   Gastrointestinal:  Negative for abdominal pain, constipation, diarrhea, nausea and vomiting.   Genitourinary:  Negative for difficulty urinating.   Musculoskeletal:  Negative for arthralgias, back pain, gait problem, joint swelling, myalgias, neck pain and neck stiffness.   Neurological:  Negative for dizziness, " speech difficulty, weakness, light-headedness, numbness and headaches.          Objective:      Physical Exam  Neurological:      Mental Status: He is alert and oriented to person, place, and time.      Comments: Physical Exam  Neurological:      Mental Status: He is alert and oriented to person, place, and time.      Comments: He is awake and in no acute distress  Mild tenderness to palpation posterior cervical and lumbar paraspinous musculature with no palpable masses  Cervical range of motion is normal and painless  Forward flexion of the lumbar spine is to about 60° before complaint pain at the lumbosacral junction.  Extension at 10° causes mild pain at the lumbosacral junction  He can heel and toe walk normally  Reflexes- +1-+2 reflexes at the following:              C5-Biceps              C6-Brachioradialis              C7-Triceps              L3/4-Patellar              S1-Achilles   Tom sign is negative bilaterally  Strength testing- 5/5 strength in the following muscle groups:  C5-Elbow flexion  C6-Wrist extension  C7-Elbow extension  C8-Finger flexion  T1-Finger abduction  L2-Hip flexion  L3-Knee extension  L4-Ankle dorsiflexion  L5-Great toe extension  S1-Ankle plantar flexion               Assessment:       1. Chronic right-sided low back pain with right-sided sciatica    2. Lymphadenopathy, abdominal           Plan:     Presents with familiar right-sided low back pain and right leg radicular symptoms.  For symptom relief I am recommending transforaminal injections on the right at L5-S1 and S1.  If he fails that consider EMG and nerve conduction studies.  Resume physical therapy.  I stressed the importance of having the CT of the abdomen and pelvis.  He needs to look into financial assistance or going through the [facility] to get that done.  He can follow up here after the procedure      Chronic right-sided low back pain with right-sided sciatica  -     Ambulatory Referral/Consult to  Physical Therapy; Future; Expected date: 01/14/2025    Lymphadenopathy, abdominal  -     CT Abdomen Pelvis With IV Contrast Routine Oral Contrast; Future; Expected date: 01/07/2025

## 2025-01-07 NOTE — H&P (VIEW-ONLY)
"  SUBJECTIVE:    Patient ID: Aashish James is a 39 y.o. male.    Chief Complaint: No chief complaint on file.    He presents today with ongoing complaints of right-sided low back pain radiating down the right leg into the calf.  These are familiar symptoms.  Historically he did not respond to any significant degree to interlaminar injections at L5-S1.  He is no longer working.  He is interested in additional physical therapy.  His pain level is 7/10 and interferes with his quality of life in terms of activities of daily living recreation and social activities.  He still has not gotten a CT of the abdomen and pelvis I recommended to follow up on some nonspecific abdominal lymphadenopathy seen incidentally on his lumbar MRI.  He says he forgot.          History reviewed. No pertinent past medical history.  Social History     Socioeconomic History    Marital status:    Tobacco Use    Smoking status: Never    Smokeless tobacco: Never     Past Surgical History:   Procedure Laterality Date    EPIDURAL STEROID INJECTION INTO LUMBAR SPINE N/A 7/16/2024    Procedure: Injection-steroid-epidural-lumbar;  Surgeon: Andrew Diana MD;  Location: [facility];  Service: Pain Management;  Laterality: N/A;     No family history on file.  Vitals:    01/07/25 1027   Weight: 91.2 kg (201 lb 1 oz)   Height: 5' 10" (1.778 m)       Review of Systems   Constitutional:  Negative for chills, diaphoresis, fatigue, fever and unexpected weight change.   HENT:  Negative for trouble swallowing.    Eyes:  Negative for visual disturbance.   Respiratory:  Negative for shortness of breath.    Cardiovascular:  Negative for chest pain.   Gastrointestinal:  Negative for abdominal pain, constipation, diarrhea, nausea and vomiting.   Genitourinary:  Negative for difficulty urinating.   Musculoskeletal:  Negative for arthralgias, back pain, gait problem, joint swelling, myalgias, neck pain and neck stiffness.   Neurological:  Negative for dizziness, " speech difficulty, weakness, light-headedness, numbness and headaches.          Objective:      Physical Exam  Neurological:      Mental Status: He is alert and oriented to person, place, and time.      Comments: Physical Exam  Neurological:      Mental Status: He is alert and oriented to person, place, and time.      Comments: He is awake and in no acute distress  Mild tenderness to palpation posterior cervical and lumbar paraspinous musculature with no palpable masses  Cervical range of motion is normal and painless  Forward flexion of the lumbar spine is to about 60° before complaint pain at the lumbosacral junction.  Extension at 10° causes mild pain at the lumbosacral junction  He can heel and toe walk normally  Reflexes- +1-+2 reflexes at the following:              C5-Biceps              C6-Brachioradialis              C7-Triceps              L3/4-Patellar              S1-Achilles   Tom sign is negative bilaterally  Strength testing- 5/5 strength in the following muscle groups:  C5-Elbow flexion  C6-Wrist extension  C7-Elbow extension  C8-Finger flexion  T1-Finger abduction  L2-Hip flexion  L3-Knee extension  L4-Ankle dorsiflexion  L5-Great toe extension  S1-Ankle plantar flexion               Assessment:       1. Chronic right-sided low back pain with right-sided sciatica    2. Lymphadenopathy, abdominal           Plan:     Presents with familiar right-sided low back pain and right leg radicular symptoms.  For symptom relief I am recommending transforaminal injections on the right at L5-S1 and S1.  If he fails that consider EMG and nerve conduction studies.  Resume physical therapy.  I stressed the importance of having the CT of the abdomen and pelvis.  He needs to look into financial assistance or going through the [facility] to get that done.  He can follow up here after the procedure      Chronic right-sided low back pain with right-sided sciatica  -     Ambulatory Referral/Consult to  Physical Therapy; Future; Expected date: 01/14/2025    Lymphadenopathy, abdominal  -     CT Abdomen Pelvis With IV Contrast Routine Oral Contrast; Future; Expected date: 01/07/2025

## 2025-01-07 NOTE — TELEPHONE ENCOUNTER
----- Message from Javon Perez MD sent at 1/7/2025 10:39 AM CST -----  Please schedule for transforaminal injection right L5-S1 and S1

## 2025-01-09 PROBLEM — M54.41 BILATERAL LOW BACK PAIN WITH RIGHT-SIDED SCIATICA: Status: ACTIVE | Noted: 2025-01-09

## 2025-01-09 PROBLEM — M54.2 CERVICALGIA: Status: ACTIVE | Noted: 2025-01-09

## 2025-01-23 ENCOUNTER — HOSPITAL ENCOUNTER (OUTPATIENT)
Facility: HOSPITAL | Age: 40
Discharge: HOME | End: 2025-01-23
Attending: ANESTHESIOLOGY | Admitting: ANESTHESIOLOGY
Payer: COMMERCIAL

## 2025-01-23 DIAGNOSIS — M54.16 LUMBAR RADICULITIS: ICD-10-CM

## 2025-01-23 PROCEDURE — 25000003 PHARM REV CODE 250: Performed by: ANESTHESIOLOGY

## 2025-01-23 PROCEDURE — 71000015 HC POSTOP RECOV 1ST HR: Performed by: ANESTHESIOLOGY

## 2025-01-23 PROCEDURE — 64483 NJX AA&/STRD TFRM EPI L/S 1: CPT | Mod: RT | Performed by: ANESTHESIOLOGY

## 2025-01-23 PROCEDURE — 36000704 HC OR TIME LEV I 1ST 15 MIN: Performed by: ANESTHESIOLOGY

## 2025-01-23 PROCEDURE — 64483 NJX AA&/STRD TFRM EPI L/S 1: CPT | Mod: RT,,, | Performed by: ANESTHESIOLOGY

## 2025-01-23 PROCEDURE — 64484 NJX AA&/STRD TFRM EPI L/S EA: CPT | Mod: RT | Performed by: ANESTHESIOLOGY

## 2025-01-23 PROCEDURE — 64484 NJX AA&/STRD TFRM EPI L/S EA: CPT | Mod: RT,,, | Performed by: ANESTHESIOLOGY

## 2025-01-23 PROCEDURE — 63600175 PHARM REV CODE 636 W HCPCS: Performed by: ANESTHESIOLOGY

## 2025-01-23 PROCEDURE — 25500020 PHARM REV CODE 255: Performed by: ANESTHESIOLOGY

## 2025-01-23 RX ORDER — SODIUM CHLORIDE, SODIUM LACTATE, POTASSIUM CHLORIDE, CALCIUM CHLORIDE 600; 310; 30; 20 MG/100ML; MG/100ML; MG/100ML; MG/100ML
INJECTION, SOLUTION INTRAVENOUS CONTINUOUS
Status: DISCONTINUED | OUTPATIENT
Start: 2025-01-23 | End: 2025-01-23 | Stop reason: HOSPADM

## 2025-01-23 RX ORDER — BUPIVACAINE HYDROCHLORIDE 2.5 MG/ML
INJECTION, SOLUTION EPIDURAL; INFILTRATION; INTRACAUDAL
Status: DISCONTINUED | OUTPATIENT
Start: 2025-01-23 | End: 2025-01-23 | Stop reason: HOSPADM

## 2025-01-23 RX ORDER — DEXAMETHASONE SODIUM PHOSPHATE 10 MG/ML
INJECTION INTRAMUSCULAR; INTRAVENOUS
Status: DISCONTINUED | OUTPATIENT
Start: 2025-01-23 | End: 2025-01-23 | Stop reason: HOSPADM

## 2025-01-23 RX ORDER — LIDOCAINE HYDROCHLORIDE 10 MG/ML
1 INJECTION, SOLUTION EPIDURAL; INFILTRATION; INTRACAUDAL; PERINEURAL ONCE
Status: DISCONTINUED | OUTPATIENT
Start: 2025-01-23 | End: 2025-01-23 | Stop reason: HOSPADM

## 2025-01-23 RX ORDER — ALPRAZOLAM 0.25 MG/1
1 TABLET, ORALLY DISINTEGRATING ORAL
Status: COMPLETED | OUTPATIENT
Start: 2025-01-23 | End: 2025-01-23

## 2025-01-23 RX ORDER — LIDOCAINE HYDROCHLORIDE 10 MG/ML
INJECTION, SOLUTION EPIDURAL; INFILTRATION; INTRACAUDAL; PERINEURAL
Status: DISCONTINUED | OUTPATIENT
Start: 2025-01-23 | End: 2025-01-23 | Stop reason: HOSPADM

## 2025-01-23 RX ADMIN — ALPRAZOLAM 1 MG: 0.25 TABLET, ORALLY DISINTEGRATING ORAL at 01:01

## 2025-01-23 NOTE — DISCHARGE SUMMARY
[facility] - Periop Services  Discharge Note  Short Stay    Procedure(s) (LRB):  Injection,steroid,epidural,transforaminal approach (Right)      OUTCOME: Patient tolerated treatment/procedure well without complication and is now ready for discharge.    DISPOSITION: Home or Self Care    FINAL DIAGNOSIS:  <principal problem not specified>    FOLLOWUP: In clinic    DISCHARGE INSTRUCTIONS:    Discharge Procedure Orders   Notify your health care provider if you experience any of the following:  temperature >100.4     Notify your health care provider if you experience any of the following:  severe uncontrolled pain     Notify your health care provider if you experience any of the following:  redness, tenderness, or signs of infection (pain, swelling, redness, odor or green/yellow discharge around incision site)     Activity as tolerated        TIME SPENT ON DISCHARGE: 30 minutes

## 2025-01-23 NOTE — OP NOTE
PROCEDURE DATE: 1/23/2025    PROCEDURE: Right L5-S1 and S1 transforaminal epidural steroid injection under fluoroscopy    DIAGNOSIS: Lumbar radiculitis    Post op diagnosis: Same    PHYSICIAN: Andrew Diana MD    MEDICATIONS INJECTED:  Dexamethasone 5mg (0.5ml) and 1.5ml 0.25% bupivicaine at each nerve root.     LOCAL ANESTHETIC INJECTED:  Lidocaine 1%. 2 ml per site.    SEDATION MEDICATIONS: None    ESTIMATED BLOOD LOSS:  None    COMPLICATIONS:  None    TECHNIQUE:   A time-out was taken to identify patient and procedure side prior to starting the procedure. The patient was placed in a prone position, prepped and draped in the usual sterile fashion using ChloraPrep and sterile towels.  The area to be injected was determined under fluoroscopic guidance in AP and oblique view.  Local anesthetic was given by raising a wheal and going down to the hub of a 25-gauge 1.5 inch needle.  In oblique view, a 3.5 inch 22-gauge bent-tip spinal needle was introduced towards 6 oclock position of the pedicle of each above named nerve root level.  The needle was walked medially then hinged into the neural foramen and position was confirmed in AP and lateral views.  1ml contrast dye was injected to confirm appropriate placement and that there was no vascular uptake.  After negative aspiration for blood or CSF, the medication was then injected. This was performed at the right L5-S1 and S1 level(s). The patient tolerated the procedure well.    The patient was monitored after the procedure.  Patient was given post procedure and discharge instructions to follow at home. The patient was discharged in a stable condition.    
(M6) obeys commands

## 2025-01-24 VITALS
SYSTOLIC BLOOD PRESSURE: 126 MMHG | OXYGEN SATURATION: 98 % | DIASTOLIC BLOOD PRESSURE: 79 MMHG | HEART RATE: 65 BPM | BODY MASS INDEX: 28.85 KG/M2 | RESPIRATION RATE: 20 BRPM | WEIGHT: 201.06 LBS | TEMPERATURE: 98 F

## 2025-08-15 ENCOUNTER — TELEPHONE (OUTPATIENT)
Dept: SPINE | Facility: CLINIC | Age: 40
End: 2025-08-15
Payer: OTHER MISCELLANEOUS

## 2025-08-29 ENCOUNTER — PATIENT MESSAGE (OUTPATIENT)
Dept: SPINE | Facility: CLINIC | Age: 40
End: 2025-08-29
Payer: OTHER MISCELLANEOUS

## (undated) DEVICE — GLOVE SENSICARE PI GRN 7.5

## (undated) DEVICE — CHLORAPREP 10.5 ML APPLICATOR

## (undated) DEVICE — TOWEL OR DISP STRL BLUE 4/PK

## (undated) DEVICE — NDL TUOHY EPIDURAL 20G X 3.5

## (undated) DEVICE — SYS LABEL CORRECT MED

## (undated) DEVICE — TUBING MINIBORE EXTENSION

## (undated) DEVICE — NDL SPINAL 22GX5

## (undated) DEVICE — SYR GLASS 5CC LUER LOK